# Patient Record
Sex: FEMALE | Race: WHITE | NOT HISPANIC OR LATINO | Employment: UNEMPLOYED | URBAN - METROPOLITAN AREA
[De-identification: names, ages, dates, MRNs, and addresses within clinical notes are randomized per-mention and may not be internally consistent; named-entity substitution may affect disease eponyms.]

---

## 2017-06-14 ENCOUNTER — ALLSCRIPTS OFFICE VISIT (OUTPATIENT)
Dept: OTHER | Facility: OTHER | Age: 42
End: 2017-06-14

## 2017-06-14 ENCOUNTER — HOSPITAL ENCOUNTER (OUTPATIENT)
Dept: RADIOLOGY | Facility: HOSPITAL | Age: 42
Discharge: HOME | End: 2017-06-14
Attending: ORTHOPAEDIC SURGERY
Payer: COMMERCIAL

## 2017-06-14 DIAGNOSIS — M25.561 PAIN IN RIGHT KNEE: ICD-10-CM

## 2017-06-14 PROCEDURE — 73562 X-RAY EXAM OF KNEE 3: CPT

## 2018-01-14 VITALS
HEART RATE: 101 BPM | BODY MASS INDEX: 21.88 KG/M2 | WEIGHT: 136.13 LBS | DIASTOLIC BLOOD PRESSURE: 80 MMHG | HEIGHT: 66 IN | SYSTOLIC BLOOD PRESSURE: 117 MMHG

## 2020-07-28 ENCOUNTER — HOSPITAL ENCOUNTER (OUTPATIENT)
Dept: RADIOLOGY | Facility: HOSPITAL | Age: 45
Discharge: HOME | End: 2020-07-28
Attending: ORTHOPAEDIC SURGERY
Payer: COMMERCIAL

## 2020-07-28 ENCOUNTER — OFFICE VISIT (OUTPATIENT)
Dept: OBGYN CLINIC | Facility: HOSPITAL | Age: 45
End: 2020-07-28
Payer: COMMERCIAL

## 2020-07-28 VITALS
HEIGHT: 66 IN | DIASTOLIC BLOOD PRESSURE: 83 MMHG | HEART RATE: 99 BPM | SYSTOLIC BLOOD PRESSURE: 134 MMHG | BODY MASS INDEX: 21.97 KG/M2

## 2020-07-28 DIAGNOSIS — M17.12 PRIMARY OSTEOARTHRITIS OF LEFT KNEE: ICD-10-CM

## 2020-07-28 DIAGNOSIS — M17.11 PRIMARY OSTEOARTHRITIS OF RIGHT KNEE: ICD-10-CM

## 2020-07-28 DIAGNOSIS — M25.561 PAIN IN BOTH KNEES, UNSPECIFIED CHRONICITY: Primary | ICD-10-CM

## 2020-07-28 DIAGNOSIS — M25.561 PAIN IN BOTH KNEES, UNSPECIFIED CHRONICITY: ICD-10-CM

## 2020-07-28 DIAGNOSIS — M25.562 PAIN IN BOTH KNEES, UNSPECIFIED CHRONICITY: Primary | ICD-10-CM

## 2020-07-28 DIAGNOSIS — M25.562 PAIN IN BOTH KNEES, UNSPECIFIED CHRONICITY: ICD-10-CM

## 2020-07-28 DIAGNOSIS — M25.572 ARTHRALGIA OF LEFT ANKLE: ICD-10-CM

## 2020-07-28 PROCEDURE — 73562 X-RAY EXAM OF KNEE 3: CPT

## 2020-07-28 PROCEDURE — 99203 OFFICE O/P NEW LOW 30 MIN: CPT | Performed by: ORTHOPAEDIC SURGERY

## 2020-07-28 PROCEDURE — 20610 DRAIN/INJ JOINT/BURSA W/O US: CPT | Performed by: ORTHOPAEDIC SURGERY

## 2020-07-28 PROCEDURE — 20605 DRAIN/INJ JOINT/BURSA W/O US: CPT | Performed by: ORTHOPAEDIC SURGERY

## 2020-07-28 RX ORDER — LIDOCAINE HYDROCHLORIDE 10 MG/ML
2 INJECTION, SOLUTION INFILTRATION; PERINEURAL
Status: COMPLETED | OUTPATIENT
Start: 2020-07-28 | End: 2020-07-28

## 2020-07-28 RX ORDER — LIDOCAINE HYDROCHLORIDE 10 MG/ML
1 INJECTION, SOLUTION INFILTRATION; PERINEURAL
Status: COMPLETED | OUTPATIENT
Start: 2020-07-28 | End: 2020-07-28

## 2020-07-28 RX ORDER — BUPIVACAINE HYDROCHLORIDE 2.5 MG/ML
2 INJECTION, SOLUTION INFILTRATION; PERINEURAL
Status: COMPLETED | OUTPATIENT
Start: 2020-07-28 | End: 2020-07-28

## 2020-07-28 RX ORDER — BETAMETHASONE SODIUM PHOSPHATE AND BETAMETHASONE ACETATE 3; 3 MG/ML; MG/ML
6 INJECTION, SUSPENSION INTRA-ARTICULAR; INTRALESIONAL; INTRAMUSCULAR; SOFT TISSUE
Status: COMPLETED | OUTPATIENT
Start: 2020-07-28 | End: 2020-07-28

## 2020-07-28 RX ORDER — BETAMETHASONE SODIUM PHOSPHATE AND BETAMETHASONE ACETATE 3; 3 MG/ML; MG/ML
12 INJECTION, SUSPENSION INTRA-ARTICULAR; INTRALESIONAL; INTRAMUSCULAR; SOFT TISSUE
Status: COMPLETED | OUTPATIENT
Start: 2020-07-28 | End: 2020-07-28

## 2020-07-28 RX ORDER — BUPIVACAINE HYDROCHLORIDE 2.5 MG/ML
1 INJECTION, SOLUTION INFILTRATION; PERINEURAL
Status: COMPLETED | OUTPATIENT
Start: 2020-07-28 | End: 2020-07-28

## 2020-07-28 RX ORDER — MELATONIN 5 MG
1 TABLET,CHEWABLE ORAL DAILY
COMMUNITY
Start: 2017-05-01

## 2020-07-28 RX ADMIN — BETAMETHASONE SODIUM PHOSPHATE AND BETAMETHASONE ACETATE 12 MG: 3; 3 INJECTION, SUSPENSION INTRA-ARTICULAR; INTRALESIONAL; INTRAMUSCULAR; SOFT TISSUE at 15:30

## 2020-07-28 RX ADMIN — LIDOCAINE HYDROCHLORIDE 1 ML: 10 INJECTION, SOLUTION INFILTRATION; PERINEURAL at 15:34

## 2020-07-28 RX ADMIN — BETAMETHASONE SODIUM PHOSPHATE AND BETAMETHASONE ACETATE 6 MG: 3; 3 INJECTION, SUSPENSION INTRA-ARTICULAR; INTRALESIONAL; INTRAMUSCULAR; SOFT TISSUE at 15:34

## 2020-07-28 RX ADMIN — BUPIVACAINE HYDROCHLORIDE 2 ML: 2.5 INJECTION, SOLUTION INFILTRATION; PERINEURAL at 15:30

## 2020-07-28 RX ADMIN — BUPIVACAINE HYDROCHLORIDE 1 ML: 2.5 INJECTION, SOLUTION INFILTRATION; PERINEURAL at 15:34

## 2020-07-28 RX ADMIN — LIDOCAINE HYDROCHLORIDE 2 ML: 10 INJECTION, SOLUTION INFILTRATION; PERINEURAL at 15:30

## 2020-07-28 NOTE — PROGRESS NOTES
Assessment:  1  Pain in both knees, unspecified chronicity  XR knee 3 vw right non injury    XR knee 3 vw left non injury   2  Primary osteoarthritis of right knee  Injection procedure prior authorization   3  Primary osteoarthritis of left knee  Injection procedure prior authorization   4  Arthralgia of left ankle         Plan:  The patient was provided with bilateral knee and left IA ankle steroid injections  The patient tolerated the procedure well  The patient should follow up in about 6 weeks for visco x1  To do next visit:  Return in about 3 months (around 10/28/2020) for visco x1   The above stated was discussed in layman's terms and the patient expressed understanding  All questions were answered to the patient's satisfaction  Scribe Attestation    I,:   Harrison Noguera am acting as a scribe while in the presence of the attending physician :        I,:   Catia Shelton MD personally performed the services described in this documentation    as scribed in my presence :              Subjective:   Last Mariee is a 53 y o  female who presents for initial evaluation of bilateral knees  She had longstanding history of knee issues  Today she complains generalized bilateral knee pain and left ankle pain  Excess activity aggravates while rest alleviates  She will use Tylenol for pain  She does use flexeril on occasion  She did find benefit from past physical therapy  She has had knee injections with benefit  She has tried visco-supplement with no benefit          Review of systems negative unless otherwise specified in HPI    Past Medical History:   Diagnosis Date    Environmental and seasonal allergies     Fibromyalgia     Lumbar dislocation     chronic pain    Osteoarthritis     Painful orthopaedic hardware ([address] )     PONV (postoperative nausea and vomiting)     severe patient states the patch helps       Past Surgical History:   Procedure Laterality Date    KNEE SURGERY Bilateral     6 left knee sx, 4 right knee sx    [place] REMOVAL DEEP IMPLANT Right 5/2/2016    Procedure: REMOVAL HARDWARE PATELLA;  Surgeon: Deon Lam MD;  [facility]: [facility];  Service: Orthopedics       History reviewed  No pertinent family history  Social History     Occupational History    Not on file   Tobacco Use    Smoking status: Never Smoker    Smokeless tobacco: Never Used   Substance and Sexual Activity    Alcohol use: Yes     Comment: social    Drug use: No    Sexual activity: Not on file         Current Outpatient Medications:     Melatonin 5 MG CHEW, Chew 1 tablet daily, Disp: , Rfl:     acetaminophen (TYLENOL) 500 mg tablet, Take 500 mg by mouth every 6 (six) hours as needed for mild pain , Disp: , Rfl:     cetirizine (ZyrTEC) 10 mg tablet, Take 10 mg by mouth daily as needed for allergies  , Disp: , Rfl:     cyclobenzaprine (FLEXERIL) 10 mg tablet, Take 10 mg by mouth daily as needed for muscle spasms  , Disp: , Rfl:     diphenhydrAMINE (BENADRYL) 25 mg tablet, Take 25 mg by mouth daily as needed for itching , Disp: , Rfl:     zolpidem (AMBIEN) 10 mg tablet, Take 10 mg by mouth daily at bedtime as needed for sleep , Disp: , Rfl:     Allergies   Allergen Reactions    Sertraline Hives     Annotation - [id number]:  Hives      Ultram [Tramadol] Hives    Xanax [Alprazolam]      insomnia    Zoloft [Sertraline Hcl] Hives    Duloxetine Rash    Nortriptyline Rash    Nsaids Rash    Tolmetin Rash            Vitals:    07/28/20 1449   BP: 134/83   Pulse: 99       Objective:  Physical exam  · General: Awake, Alert, Oriented  · Eyes: Pupils equal, round and reactive to light  · Heart: regular rate and rhythm  · Lungs: No audible wheezing  · Abdomen: soft                    Ortho Exam   Bilateral knees:  Well healed anterior incisions  Slight valgus alignment  TTP over medial joint line  No erythema or ecchymosis  No effusion or swelling  Normal strength  Good ROM       Left ankle:  Mild restriction of inversion, eversion, DF and PF  TTP over medial malleolus  TTP over anterior ankle  Calf compartments soft and supple  Sensation intact  Toes are warm sensate and mobile          Diagnostics, reviewed and taken today if performed as documented: The attending physician has personally reviewed the pertinent films in PACS and interpretation is as follows:  Bilateral knee x-rays:  S/p corrective osteotomy of proximal tibia        Procedures, if performed today:    Large joint arthrocentesis: R knee  Date/Time: 7/28/2020 3:30 PM  Consent given by: patient  Site marked: site marked  Supporting Documentation  Indications: pain   Procedure Details  Location: knee - R knee  Preparation: Patient was prepped and draped in the usual sterile fashion  Needle size: 22 G  Ultrasound guidance: no  Approach: anterolateral  Medications administered: 12 mg betamethasone acetate-betamethasone sodium phosphate 6 (3-3) mg/mL; 2 mL bupivacaine 0 25 %; 2 mL lidocaine 1 %    Patient tolerance: patient tolerated the procedure well with no immediate complications  Dressing:  Sterile dressing applied    Large joint arthrocentesis: L knee  Date/Time: 7/28/2020 3:30 PM  Consent given by: patient  Site marked: site marked  Supporting Documentation  Indications: pain   Procedure Details  Location: knee - L knee  Preparation: Patient was prepped and draped in the usual sterile fashion  Needle size: 22 G  Ultrasound guidance: no  Approach: anterolateral  Medications administered: 12 mg betamethasone acetate-betamethasone sodium phosphate 6 (3-3) mg/mL; 2 mL bupivacaine 0 25 %; 2 mL lidocaine 1 %    Patient tolerance: patient tolerated the procedure well with no immediate complications  Dressing:  Sterile dressing applied    Medium joint arthrocentesis: L ankle  Date/Time: 7/28/2020 3:34 PM  Consent given by: patient  Site marked: site marked  Timeout: Immediately prior to procedure a time out was called to verify the correct patient, procedure, equipment, support staff and site/side marked as required   Supporting Documentation  Indications: pain   Procedure Details  Location: ankle - L ankle  Needle size: 22 G  Ultrasound guidance: no  Approach: anterolateral  Medications administered: 1 mL bupivacaine 0 25 %; 6 mg betamethasone acetate-betamethasone sodium phosphate 6 (3-3) mg/mL; 1 mL lidocaine 1 %    Patient tolerance: patient tolerated the procedure well with no immediate complications  Dressing:  Sterile dressing applied          Portions of the record may have been created with voice recognition software  Occasional wrong word or "sound a like" substitutions may have occurred due to the inherent limitations of voice recognition software  Read the chart carefully and recognize, using context, where substitutions have occurred

## 2020-12-30 ENCOUNTER — OFFICE VISIT (OUTPATIENT)
Dept: OBGYN CLINIC | Facility: HOSPITAL | Age: 45
End: 2020-12-30
Payer: COMMERCIAL

## 2020-12-30 VITALS
SYSTOLIC BLOOD PRESSURE: 125 MMHG | HEIGHT: 66 IN | BODY MASS INDEX: 21.95 KG/M2 | DIASTOLIC BLOOD PRESSURE: 76 MMHG | HEART RATE: 94 BPM | WEIGHT: 136.6 LBS

## 2020-12-30 DIAGNOSIS — M17.12 ARTHRITIS OF LEFT KNEE: ICD-10-CM

## 2020-12-30 DIAGNOSIS — M25.562 CHRONIC PAIN OF LEFT KNEE: Primary | ICD-10-CM

## 2020-12-30 DIAGNOSIS — G89.29 CHRONIC PAIN OF LEFT KNEE: Primary | ICD-10-CM

## 2020-12-30 PROCEDURE — 20610 DRAIN/INJ JOINT/BURSA W/O US: CPT | Performed by: PHYSICIAN ASSISTANT

## 2021-04-14 ENCOUNTER — OFFICE VISIT (OUTPATIENT)
Dept: OBGYN CLINIC | Facility: MEDICAL CENTER | Age: 46
End: 2021-04-14
Payer: COMMERCIAL

## 2021-04-14 ENCOUNTER — APPOINTMENT (OUTPATIENT)
Dept: RADIOLOGY | Facility: MEDICAL CENTER | Age: 46
End: 2021-04-14
Payer: COMMERCIAL

## 2021-04-14 VITALS
HEIGHT: 66 IN | WEIGHT: 136 LBS | DIASTOLIC BLOOD PRESSURE: 78 MMHG | BODY MASS INDEX: 21.86 KG/M2 | HEART RATE: 84 BPM | SYSTOLIC BLOOD PRESSURE: 119 MMHG

## 2021-04-14 DIAGNOSIS — M54.50 ACUTE LOW BACK PAIN WITHOUT SCIATICA, UNSPECIFIED BACK PAIN LATERALITY: ICD-10-CM

## 2021-04-14 DIAGNOSIS — M54.41 CHRONIC BILATERAL LOW BACK PAIN WITH BILATERAL SCIATICA: Primary | ICD-10-CM

## 2021-04-14 DIAGNOSIS — G89.29 CHRONIC BILATERAL LOW BACK PAIN WITH BILATERAL SCIATICA: Primary | ICD-10-CM

## 2021-04-14 DIAGNOSIS — M54.42 CHRONIC BILATERAL LOW BACK PAIN WITH BILATERAL SCIATICA: Primary | ICD-10-CM

## 2021-04-14 PROCEDURE — 99214 OFFICE O/P EST MOD 30 MIN: CPT | Performed by: PHYSICAL MEDICINE & REHABILITATION

## 2021-04-14 PROCEDURE — 72100 X-RAY EXAM L-S SPINE 2/3 VWS: CPT

## 2021-04-14 RX ORDER — CYCLOBENZAPRINE HCL 10 MG
10 TABLET ORAL
Qty: 30 TABLET | Refills: 0 | Status: SHIPPED | OUTPATIENT
Start: 2021-04-14 | End: 2021-07-28 | Stop reason: SDUPTHER

## 2021-04-14 RX ORDER — METHYLPREDNISOLONE 4 MG/1
TABLET ORAL
Qty: 1 EACH | Refills: 0 | Status: SHIPPED | OUTPATIENT
Start: 2021-04-14

## 2021-04-14 NOTE — PATIENT INSTRUCTIONS
You will be starting physical therapy  It is important to do home exercises as given by your physical therapist as you go through PT to speed up your recovery  Physical therapy addresses the problems that are causing your pain, instead of covering them up, as some other treatment options do  We will see you back after completing physical therapy  Warm soaks with epsom salt can also help with muscle tightness/cramping  Epsom salt releases magnesium which can be helpful  Over-the-counter salonpas patches can be applied to the area of discomfort to help with pain  There are two types of patches; one contains lidocaine 4% and the other contains menthol, camphor and methyl salicylate  You can try one or the other and see which one works best for you

## 2021-04-14 NOTE — PROGRESS NOTES
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 myofascial spasticity/strain and postural/core instability  There are no concerning neurological deficits  Patient has findings consistent with spina bifida occulta on her lumbar spine x-rays  We discussed that this is something that has been with her since birth  We discussed different treatment options and decided to proceed with cyclobenzaprine and medrol dose pack  Patient reports rash to most NSAID's  The patient should start physical therapy as soon as possible  I will see them back after 6 weeks of physical therapy or earlier if symptoms worsen/change  Return in about 6 weeks (around 5/26/2021)  or earlier if symptoms worsen/change  Imaging Studies (I personally reviewed images in PACS and report if it was available):  Lumbar spine x-rays that are most recent to this encounter were reviewed  These images show findings consistent with spina bifida occulta  There is lumbarization of the 1st sacral segment  There is disc space narrowing in the last two disc spaces  There are no acute osseous abnormalities  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  HPI:  Neyda Jhaveri is a 50 y o  female  who presents for evaluation of   Chief Complaint   Patient presents with    Spine - Pain    Neck - Pain       Onset/Mechanism: Chronic pain for over 32 years without any injury  Location:  Across the low back and into upper and mid back on the right side    Radiation:  She gets it shooting down the back of both legs/thighs  She has tingling in her feet  Quality: Shooting  Provocative: Just sitting and turning can make the pain worse  Severity:  8/10 at its worst and currently a 5/10  Associated Symptoms: Trouble doing simple tasks  Treatment so far:  Patient has had extensive treatment for this including physical therapy, epidural steroid injections, facet joint blocks, acupuncture, chiropractic medication, narcotic medications and anti-inflammatory medications  She has had this treatment over the past 30 years or so  Recently saw [facility] pain management which showed stenosis  Review of Systems   Constitutional: Positive for activity change  Negative for fever  HENT: Negative for trouble swallowing  Eyes: Negative for visual disturbance  Respiratory: Negative for shortness of breath  Cardiovascular: Negative for chest pain  Gastrointestinal: Negative for abdominal pain  Denies bowel incontinence  Endocrine: Negative for polydipsia  Genitourinary:        Denies urinary incontinence  Musculoskeletal: Positive for back pain  Negative for gait problem  Skin: Negative for rash  Allergic/Immunologic: Negative for immunocompromised state  Neurological: Positive for numbness  Negative for weakness  Denies saddle anesthesia  Hematological: Does not bruise/bleed easily  Psychiatric/Behavioral: Negative for confusion  No red flag symptoms including fever/chills, unintentional weight change, bowel/bladder incontinence, scissoring gait, personal/family history of cancer, pain worse at night, intravenous drug abuse or trauma        Following history reviewed and updated:  Past Medical History:   Diagnosis Date    Environmental and seasonal allergies     Fibromyalgia     Lumbar dislocation     chronic pain    Osteoarthritis     Painful orthopaedic hardware ([address] )     PONV (postoperative nausea and vomiting)     severe patient states the patch helps     Past Surgical History:   Procedure Laterality Date    KNEE SURGERY Bilateral     6 left knee sx, 4 right knee sx    [place] REMOVAL DEEP IMPLANT Right 5/2/2016    Procedure: REMOVAL HARDWARE PATELLA;  Surgeon: Juan Whitten MD;  [facility];  Service: Orthopedics     Social History   Social History     Substance and Sexual Activity   Alcohol Use Yes    Comment: social     Social History     Substance and Sexual Activity   Drug Use No     Social History     Tobacco Use   Smoking Status Never Smoker   Smokeless Tobacco Never Used     History reviewed  No pertinent family history  Allergies   Allergen Reactions    Sertraline Hives     Annotation - [id number]: Hives      Ultram [Tramadol] Hives    Xanax [Alprazolam]      insomnia    Zoloft [Sertraline Hcl] Hives    Duloxetine Rash    Nortriptyline Rash    Nsaids Rash    Tolmetin Rash        Constitutional:  /78 (BP Location: Right arm, Patient Position: Sitting, Cuff Size: Standard)   Pulse 84   Ht 5' 6" (1 676 m)   Wt 61 7 kg (136 lb)   BMI 21 95 kg/m²    General: NAD  Eyes: Anicteric sclerae  Neck: Supple  Lungs: Unlabored breathing  Cardiovascular: No lower extremity edema  Skin: Intact without erythema  Neurologic: Sensation intact to light touch  Psychiatric: Mood and affect are appropriate  Orthopedic Examination   Inspection: No obvious deformities, lesions or rashes   ROM: Within normal limits   Palpation: Diffusely TTP both axially and peripherally throughout the thoracolumbar spine  There are no step offs   Neuro: Bilateral extremity strength is normal and symmetric  No muscle atrophy or abnormal tone  Bilateral extremity muscle stretch reflexes are physiologic and symmetric   No myelopathic signs  Sensation to light touch is intact throughout  Neural compression testing: Normal bilateral SLR/dural stretch  Positive BL Mariscal's maneuver    Positive left Nikhil's test   Gait is normal     Procedures

## 2021-04-20 NOTE — PROGRESS NOTES
PT Evaluation     Today's date: 2021  Patient name: Aiyana Gong  : 1975  MRN: [id number]  Referring provider: Devin Plascencia DO  Dx:   Encounter Diagnosis     ICD-10-CM    1  Chronic bilateral low back pain with bilateral sciatica  M54 42     M54 41     G89 29                   Assessment  Assessment details: PT notes the patient with decrease ROM and strength t/o the lumbar spine with trunk/core weakness leading to impaired spinal stability with increase pain levels, LE radicular symptoms, and functional limitations with need for course of skilled therapy for 6 weeks with focus on lumbar stabilization, manual therapy, posture, analgesic modalities, and HEP  Impairments: abnormal or restricted ROM, activity intolerance, impaired physical strength, lacks appropriate home exercise program and pain with function  Understanding of Dx/Px/POC: good   Prognosis: fair    Goals  ST  Initiate HEP  2  Decrease pain levels by 25-50%   3  Increase strength by 1-2 mm grades  4  Increase ROM by 25-50%   LT  Improve spinal stability with increase trunk/core strength   2  Decrease limitations with standing, walking and stairs  3  Decrease limitations with trunk movement and push/pull activities  4  Decrease limitations with lifting and carrying  5  Decrease limitations with ADL  6   DC with HEP     Plan  Plan details: PT notes review of POC and findings with patient who is in agreement with PT recommendations of course of skilled therapy     Patient would benefit from: skilled physical therapy and PT eval  Planned modality interventions: thermotherapy: hydrocollator packs  Planned therapy interventions: manual therapy, neuromuscular re-education, patient education, self care, strengthening, stretching, therapeutic exercise, home exercise program, graded exercise and flexibility  Frequency: 2x week  Duration in weeks: 6  Treatment plan discussed with: patient        Subjective Evaluation    History of Present Illness  Mechanism of injury: Patient reports dealing with increase LBP since childhood from degenerative changes t/o the lumbar spine  Patient was diagnosed with sacralization of the lower lumbar spine as well as stenosis of the lumbar spine  Patient reports was treated with multiple injections in the lumbar spine with minimal to no change on status  Patient reports several treatments by chiropractor with minimal change  Patient reports difficulty with sitting, driving, walking, standing, trunk movement, lifting, twisting, , ADL, and sleep  Patient reports constant N/T in the bilateral feet with R>L  Patient went ortho MD for evaluation and was referred to OPPT for evaluation and treatment of LBP  Patient reports she is employed as [employer] with significant PMH of OA and 11 bilateral knee surgeries for patellar realignment  Pain  Current pain ratin  At best pain ratin  At worst pain ratin  Location: Lumbar spine   Quality: radiating, tight, discomfort and dull ache  Relieving factors: heat and rest  Aggravating factors: stair climbing, walking, standing and lifting      Diagnostic Tests  X-ray: abnormal  MRI studies: abnormal  Treatments  Previous treatment: chiropractic, medication, physical therapy and injection treatment  Current treatment: physical therapy  Patient Goals  Patient goals for therapy: decreased pain, increased motion, increased strength and independence with ADLs/IADLs          Objective     Palpation   Left   Muscle spasm in the erector spinae and lumbar paraspinals  Tenderness of the erector spinae and lumbar paraspinals  Right   Muscle spasm in the erector spinae and lumbar paraspinals  Tenderness of the erector spinae and lumbar paraspinals  Tenderness     Lumbar Spine  Tenderness in the spinous process  Left Hip   Tenderness in the PSIS  Right Hip   Tenderness in the PSIS       Neurological Testing Reflexes   Left   Patellar (L4): normal (2+)  Achilles (S1): normal (2+)    Right   Patellar (L4): normal (2+)  Achilles (S1): normal (2+)    Active Range of Motion     Lumbar   Flexion:  WFL and with pain  Extension: 19 degrees  with pain  Left lateral flexion: 24 degrees    with pain  Right lateral flexion: 23 degrees   Left rotation: 17 degrees   Right rotation: 15 degrees   Left Hip   Flexion: 81 degrees   Extension: 2 degrees   Abduction: WFL  External rotation (90/90): 21 degrees   Internal rotation (90/90): 11 degrees     Right Hip   Flexion: 83 degrees   Extension: 3 degrees   Abduction: WFL  External rotation (90/90): 19 degrees   Internal rotation (90/90): 11 degrees   Left Knee   Normal active range of motion    Right Knee   Normal active range of motion    Additional Active Range of Motion Details  PT notes decrease ROM and strength t/o the lumbar spine with abdominals of 3/5 and lumbar paraspinals of 2+/5     Passive Range of Motion   Left Hip   Flexion: WFL  Extension: 6 degrees   External rotation (90/90): 21 degrees   Internal rotation (90/90): 15 degrees     Right Hip   Flexion: WFL  Extension: 5 degrees   External rotation (90/90): 20 degrees   Internal rotation (90/90): 14 degrees   Mechanical Assessment    Cervical      Thoracic      Lumbar    Standing flexion: repeated movements   Pain location:peripheralized  Pain intensity: worse  Pain level: increased  Standing extension: repeated movements  Pain location: centralized  Pain intensity: better  Pain level: decreased  Left Sidegliding: repeated movements  Pain location: centralized  Pain intensity: worse  Pain level: increased  Right sidegliding: repeated movements  Pain location: centralized  Pain intensity:worse  Pain level: increased    Strength/Myotome Testing     Left Hip   Planes of Motion   Flexion: 4  Extension: 4+  Abduction: 4+  Adduction: 5  External rotation: 4-  Internal rotation: 4-    Right Hip   Planes of Motion   Flexion: 4  Extension: 4+  Abduction: 4+  Adduction: 5  External rotation: 4-  Internal rotation: 4-    Left Knee   Flexion: 4  Extension: 4-  Quadriceps contraction: fair    Right Knee   Flexion: 4  Extension: 4-  Quadriceps contraction: fair    Tests     Left Hip   Jamar: Positive  Right Hip   Jamar: Positive                Precautions:  Chronic bilateral knee pain       Manuals 4/21        Bilateral HS, piriformis, quad, ITB and gastroc NV         Manual lumbar traction with PA mobs NV                           Neuro Re-Ed         Stand Lumbar extension against bar         Stand OAL          PPU with exhale          Bridge with Tball          Supine Tball ABD crunch          Seated Tball PNF and trunk rotation         Seated hip march and LAQ                                                       Ther Ex         Treadmill         Tball LTR                                                       HEP update/review  15 min         Ther Activity                           Gait Training                           Modalities         MHP to the LB in seated 15 min

## 2021-04-21 ENCOUNTER — EVALUATION (OUTPATIENT)
Dept: PHYSICAL THERAPY | Facility: CLINIC | Age: 46
End: 2021-04-21
Payer: COMMERCIAL

## 2021-04-21 DIAGNOSIS — G89.29 CHRONIC BILATERAL LOW BACK PAIN WITH BILATERAL SCIATICA: Primary | ICD-10-CM

## 2021-04-21 DIAGNOSIS — M54.42 CHRONIC BILATERAL LOW BACK PAIN WITH BILATERAL SCIATICA: Primary | ICD-10-CM

## 2021-04-21 DIAGNOSIS — M54.41 CHRONIC BILATERAL LOW BACK PAIN WITH BILATERAL SCIATICA: Primary | ICD-10-CM

## 2021-04-21 PROCEDURE — 97162 PT EVAL MOD COMPLEX 30 MIN: CPT | Performed by: PHYSICAL THERAPIST

## 2021-04-21 PROCEDURE — 97535 SELF CARE MNGMENT TRAINING: CPT | Performed by: PHYSICAL THERAPIST

## 2021-04-28 ENCOUNTER — OFFICE VISIT (OUTPATIENT)
Dept: PHYSICAL THERAPY | Facility: CLINIC | Age: 46
End: 2021-04-28
Payer: COMMERCIAL

## 2021-04-28 DIAGNOSIS — M54.41 CHRONIC BILATERAL LOW BACK PAIN WITH BILATERAL SCIATICA: Primary | ICD-10-CM

## 2021-04-28 DIAGNOSIS — M54.42 CHRONIC BILATERAL LOW BACK PAIN WITH BILATERAL SCIATICA: Primary | ICD-10-CM

## 2021-04-28 DIAGNOSIS — G89.29 CHRONIC BILATERAL LOW BACK PAIN WITH BILATERAL SCIATICA: Primary | ICD-10-CM

## 2021-04-28 PROCEDURE — 97140 MANUAL THERAPY 1/> REGIONS: CPT

## 2021-04-28 PROCEDURE — 97110 THERAPEUTIC EXERCISES: CPT

## 2021-04-28 NOTE — PROGRESS NOTES
Daily Note     Today's date: 2021  Patient name: Verona Orourke  : 1975  MRN: [id number]  Referring provider: Genia Hills DO  Dx:   Encounter Diagnosis     ICD-10-CM    1  Chronic bilateral low back pain with bilateral sciatica  M54 42     M54 41     G89 29                   Subjective: patient stated she has low back pain that wraps around her pelvis                       Pain in 6/10      Objective: See treatment diary below      Assessment: Educated patient on POC established for their specific diagnosis and their LOF  Verbal and visual cues required for proper execution of exercise with program  Overall tolerated treatment well  Will continue to progress in upcoming visits as able      Plan: Continue per plan of care  Progress treatment as tolerated         Precautions:  Chronic bilateral knee pain       Manuals        Bilateral HS, piriformis, quad, ITB and gastroc NV  10 min        Manual lumbar traction with PA mobs NV  5 min                         Neuro Re-Ed         Stand Lumbar extension against bar  10x 5" hold        Stand OAL   10x 5" hold       PPU with exhale   10x       Bridge with Tball          Supine Tball ABD crunch   10x each       Seated Tball PNF and trunk rotation  10x each       Seated hip march and LAQ on physio ball   10x each                                                     Ther Ex         Treadmill  10 min   1 5 mph        Tball LTR   10x                                                     HEP update/review  15 min         Ther Activity                           Gait Training                           Modalities         MHP to the LB in seated 15 min  15 min

## 2021-05-03 ENCOUNTER — OFFICE VISIT (OUTPATIENT)
Dept: PHYSICAL THERAPY | Facility: CLINIC | Age: 46
End: 2021-05-03
Payer: COMMERCIAL

## 2021-05-03 DIAGNOSIS — M54.42 CHRONIC BILATERAL LOW BACK PAIN WITH BILATERAL SCIATICA: Primary | ICD-10-CM

## 2021-05-03 DIAGNOSIS — M54.41 CHRONIC BILATERAL LOW BACK PAIN WITH BILATERAL SCIATICA: Primary | ICD-10-CM

## 2021-05-03 DIAGNOSIS — G89.29 CHRONIC BILATERAL LOW BACK PAIN WITH BILATERAL SCIATICA: Primary | ICD-10-CM

## 2021-05-03 PROCEDURE — 97140 MANUAL THERAPY 1/> REGIONS: CPT

## 2021-05-03 PROCEDURE — 97112 NEUROMUSCULAR REEDUCATION: CPT

## 2021-05-03 PROCEDURE — 97110 THERAPEUTIC EXERCISES: CPT

## 2021-05-03 NOTE — PROGRESS NOTES
Daily Note     Today's date: 5/3/2021  Patient name: Andrey Malik  : 1975  MRN: [id number]  Referring provider: Alfonso Choi DO  Dx:   Encounter Diagnosis     ICD-10-CM    1  Chronic bilateral low back pain with bilateral sciatica  M54 42     M54 41     G89 29                   Subjective: patient stated she was sore after last therapy session  Objective: See treatment diary below      Assessment: Patient tolerated treatment well  No exacerbation of sx with program  Patient able to complete all tasks assigned with good execution  Patient would benefit from continued therapy to decrease pain and increase strength and flexibility to improve LOF  Plan: Continue per plan of care  Progress treatment as tolerated         Precautions:  Chronic bilateral knee pain       Manuals 4/21 4/28 5/3      Bilateral HS, piriformis, quad, ITB and gastroc NV  10 min  10 min       Manual lumbar traction with PA mobs NV  5 min 5 min                         Neuro Re-Ed         Stand Lumbar extension against bar  10x 5" hold  10x 5" hold       Stand OAL   10x 5" hold 10x 5" hold       PPU with exhale   10x 10x       Bridge with Tball    2x10      Supine Tball ABD crunch   10x each 10x each      Seated Tball PNF and trunk rotation  10x each 15x each       Seated hip march and LAQ on physio ball   10x each  2x10 each                                                    Ther Ex         Treadmill  10 min   1 5 mph  10 min  2 0 mph       Tball LTR   10x  10x                                                    HEP update/review  15 min         Ther Activity                           Gait Training                           Modalities         MHP to the LB in seated 15 min  15 min 15 min

## 2021-05-05 ENCOUNTER — APPOINTMENT (OUTPATIENT)
Dept: PHYSICAL THERAPY | Facility: CLINIC | Age: 46
End: 2021-05-05
Payer: COMMERCIAL

## 2021-05-13 ENCOUNTER — OFFICE VISIT (OUTPATIENT)
Dept: PHYSICAL THERAPY | Facility: CLINIC | Age: 46
End: 2021-05-13
Payer: COMMERCIAL

## 2021-05-13 DIAGNOSIS — G89.29 CHRONIC BILATERAL LOW BACK PAIN WITH BILATERAL SCIATICA: Primary | ICD-10-CM

## 2021-05-13 DIAGNOSIS — M54.41 CHRONIC BILATERAL LOW BACK PAIN WITH BILATERAL SCIATICA: Primary | ICD-10-CM

## 2021-05-13 DIAGNOSIS — M54.42 CHRONIC BILATERAL LOW BACK PAIN WITH BILATERAL SCIATICA: Primary | ICD-10-CM

## 2021-05-13 PROCEDURE — 97110 THERAPEUTIC EXERCISES: CPT

## 2021-05-13 PROCEDURE — 97112 NEUROMUSCULAR REEDUCATION: CPT

## 2021-05-13 PROCEDURE — 97140 MANUAL THERAPY 1/> REGIONS: CPT

## 2021-05-13 NOTE — PROGRESS NOTES
Daily Note     Today's date: 2021  Patient name: Dilshad Aquino  : 1975  MRN: [id number]  Referring provider: Isrrael Mckeon DO  Dx:   Encounter Diagnosis     ICD-10-CM    1  Chronic bilateral low back pain with bilateral sciatica  M54 42     M54 41     G89 29                   Subjective: patient reported no change in sx still having pain in low back with numbness in right LE  Patient did say she had numbness in her left great toe but did not last        Objective: See treatment diary below      Assessment: continue to work with same POC of extension based and core stabilization exercises to decrease sx  Added 2 new exercises with good tolerance and good execution  Patient would benefit from continued therapy to decrease pain and increase strength and flexibility to improve overall LOF      Plan: Continue per plan of care  Progress treatment as tolerated         Precautions:  Chronic bilateral knee pain       Manuals 4/21 4/28 5/3 5/13     Bilateral HS, piriformis, quad, ITB and gastroc NV  10 min  10 min  12 min     Manual lumbar traction with PA mobs NV  5 min 5 min  3 min traction only                       Neuro Re-Ed         Stand Lumbar extension against bar  10x 5" hold  10x 5" hold  10x 5" hold     Stand OAL   10x 5" hold 10x 5" hold  10x 5" hold      PPU with exhale   10x 10x  10x     Bridge with Tball    2x10 10x     Supine Tball ABD crunch   10x each 10x each 10x each     Seated Tball PNF and trunk rotation  10x each 15x each  15x each     Seated hip march and LAQ on physio ball   10x each  2x10 each  2x10 each     Stan flossing     3x 10 pumps  bilat                                          Ther Ex         Treadmill  10 min   1 5 mph  10 min  2 0 mph  10 min  2 0 mph     Tball LTR   10x  10x  10x 3" hold      Core stabalization: side step/arms out hold T-band    10x each way  Single blue band                                         HEP update/review  15 min         Ther Activity Gait Training                           Modalities         MHP to the LB in seated 15 min  15 min 15 min 15 min

## 2021-05-19 ENCOUNTER — OFFICE VISIT (OUTPATIENT)
Dept: PHYSICAL THERAPY | Facility: CLINIC | Age: 46
End: 2021-05-19
Payer: COMMERCIAL

## 2021-05-19 DIAGNOSIS — G89.29 CHRONIC BILATERAL LOW BACK PAIN WITH BILATERAL SCIATICA: Primary | ICD-10-CM

## 2021-05-19 DIAGNOSIS — M54.42 CHRONIC BILATERAL LOW BACK PAIN WITH BILATERAL SCIATICA: Primary | ICD-10-CM

## 2021-05-19 DIAGNOSIS — M54.41 CHRONIC BILATERAL LOW BACK PAIN WITH BILATERAL SCIATICA: Primary | ICD-10-CM

## 2021-05-19 PROCEDURE — 97140 MANUAL THERAPY 1/> REGIONS: CPT

## 2021-05-19 PROCEDURE — 97112 NEUROMUSCULAR REEDUCATION: CPT

## 2021-05-19 PROCEDURE — 97110 THERAPEUTIC EXERCISES: CPT

## 2021-05-19 NOTE — PROGRESS NOTES
Daily Note     Today's date: 2021  Patient name: Que Villarreal  : 1975  MRN: [id number]  Referring provider: Varsha Bautista DO  Dx:   Encounter Diagnosis     ICD-10-CM    1  Chronic bilateral low back pain with bilateral sciatica  M54 42     M54 41     G89 29                   Subjective: patient stated she will not be able to come for two weeks after today due to conflict in schedule  Patient stated her pain level is about the same 2/10      Objective: See treatment diary below      Assessment: Patient able to progress with repetitions and resistance with various exercises  Good tolerance overall with progression with minimal cues required  Some pain in bilateral knees with side step with resistance  Patient continues to present with deficits with strength and ROM requiring continued skilled physical therapy    Plan: Continue per plan of care  Progress treatment as tolerated         Precautions:  Chronic bilateral knee pain       Manuals 4/21 4/28 5/3 5/13 5/19    Bilateral HS, piriformis, quad, ITB and gastroc NV  10 min  10 min  12 min 12 min     Manual lumbar traction with PA mobs NV  5 min 5 min  3 min traction only 3 min   Traction only                      Neuro Re-Ed         Stand Lumbar extension against bar  10x 5" hold  10x 5" hold  10x 5" hold 10x     Stand OAL   10x 5" hold 10x 5" hold  10x 5" hold  10x 5" hold    PPU with exhale   10x 10x  10x 10x    Bridge with Tball    2x10 10x 10x    Supine Tball ABD crunch   10x each 10x each 10x each 10x each    Seated Tball PNF and trunk rotation  10x each 15x each  15x each 15x each    Seated hip march and LAQ on physio ball   10x each  2x10 each  2x10 each 2x10 each    Stan flossing     3x 10 pumps  bilat  3x 10 pumps bilat                                         Ther Ex         Treadmill  10 min   1 5 mph  10 min  2 0 mph  10 min  2 0 mph 10 min  2 0 mph    Tball LTR   10x  10x  10x 3" hold  10x 3" hold     Core stabalization: side step/arms out hold T-band    10x each way  Single blue band 10x each  Way single blue band                                         HEP update/review  15 min         Ther Activity                           Gait Training                           Modalities         MHP to the LB in seated 15 min  15 min 15 min 15 min  15 min

## 2021-06-01 ENCOUNTER — TELEPHONE (OUTPATIENT)
Dept: OBGYN CLINIC | Facility: HOSPITAL | Age: 46
End: 2021-06-01

## 2021-06-07 ENCOUNTER — OFFICE VISIT (OUTPATIENT)
Dept: PHYSICAL THERAPY | Facility: CLINIC | Age: 46
End: 2021-06-07
Payer: COMMERCIAL

## 2021-06-07 DIAGNOSIS — M54.42 CHRONIC BILATERAL LOW BACK PAIN WITH BILATERAL SCIATICA: Primary | ICD-10-CM

## 2021-06-07 DIAGNOSIS — M54.41 CHRONIC BILATERAL LOW BACK PAIN WITH BILATERAL SCIATICA: Primary | ICD-10-CM

## 2021-06-07 DIAGNOSIS — G89.29 CHRONIC BILATERAL LOW BACK PAIN WITH BILATERAL SCIATICA: Primary | ICD-10-CM

## 2021-06-07 PROCEDURE — 97140 MANUAL THERAPY 1/> REGIONS: CPT

## 2021-06-07 PROCEDURE — 97110 THERAPEUTIC EXERCISES: CPT

## 2021-06-07 PROCEDURE — 97112 NEUROMUSCULAR REEDUCATION: CPT

## 2021-06-07 NOTE — PROGRESS NOTES
Daily Note     Today's date: 2021  Patient name: Maddison Green  : 1975  MRN: [id number]  Referring provider: Jessica Guardado DO  Dx:   Encounter Diagnosis     ICD-10-CM    1  Chronic bilateral low back pain with bilateral sciatica  M54 42     M54 41     G89 29                   Subjective: patient c/o bilateral knee pain today  Patient stated tiny movement will suddenly hurt her back  Patient stated its hard to avoid becaous      Objective: See treatment diary below      Assessment: patient was able to complete all tasks despite subjective comments  PTA noted continuation of progrssion of POC to focus on core stabilization and LE strengthening to decrease pain levels improve functional limitations to meet goals  Plan: Continue per plan of care  Progress treatment as tolerated         Precautions:  Chronic bilateral knee pain       Manuals 4/21 4/28 5/3 5/13 5/19 6/7   Bilateral HS, piriformis, quad, ITB and gastroc NV  10 min  10 min  12 min 12 min  12min   Manual lumbar traction with PA mobs NV  5 min 5 min  3 min traction only 3 min   Traction only 3 min traction only                      Neuro Re-Ed         Stand Lumbar extension against bar  10x 5" hold  10x 5" hold  10x 5" hold 10x  10x   Stand OAL   10x 5" hold 10x 5" hold  10x 5" hold  10x 5" hold 10x 5" hold   PPU with exhale   10x 10x  10x 10x    Bridge with Tball    2x10 10x 10x 10x   Supine Tball ABD crunch   10x each 10x each 10x each 10x each 10x each   Seated Tball PNF and trunk rotation  10x each 15x each  15x each 15x each 2x10  1#   Seated hip march and LAQ on physio ball   10x each  2x10 each  2x10 each 2x10 each 2x10   Red wt ball    Stan flossing     3x 10 pumps  bilat  3x 10 pumps bilat  Missed                                        Ther Ex         Treadmill  10 min   1 5 mph  10 min  2 0 mph  10 min  2 0 mph 10 min  2 0 mph 10 min  1 5 mphy   Tball LTR   10x  10x  10x 3" hold  10x 3" hold  10x 3" hold    Core stabalization: side step/arms out hold T-band    10x each way  Single blue band 10x each  Way single blue band  10x each way single blue band                                        HEP update/review  15 min         Ther Activity                           Gait Training                           Modalities         MHP to the LB in seated 15 min  15 min 15 min 15 min  15 min 15 min

## 2021-06-09 ENCOUNTER — OFFICE VISIT (OUTPATIENT)
Dept: PHYSICAL THERAPY | Facility: CLINIC | Age: 46
End: 2021-06-09
Payer: COMMERCIAL

## 2021-06-09 DIAGNOSIS — M54.42 CHRONIC BILATERAL LOW BACK PAIN WITH BILATERAL SCIATICA: Primary | ICD-10-CM

## 2021-06-09 DIAGNOSIS — M54.41 CHRONIC BILATERAL LOW BACK PAIN WITH BILATERAL SCIATICA: Primary | ICD-10-CM

## 2021-06-09 DIAGNOSIS — G89.29 CHRONIC BILATERAL LOW BACK PAIN WITH BILATERAL SCIATICA: Primary | ICD-10-CM

## 2021-06-09 PROCEDURE — 97140 MANUAL THERAPY 1/> REGIONS: CPT

## 2021-06-09 PROCEDURE — 97110 THERAPEUTIC EXERCISES: CPT

## 2021-06-09 NOTE — PROGRESS NOTES
Daily Note     Today's date: 2021  Patient name: Adrianna Jamil  : 1975  MRN: [id number]  Referring provider: Kasandra Anderson DO  Dx:   Encounter Diagnosis     ICD-10-CM    1  Chronic bilateral low back pain with bilateral sciatica  M54 42     M54 41     G89 29                   Subjective: patient c/o left low back pain today reaching 6-7/10 on pain scale reaching mid hamstring  Objective: See treatment diary below      Assessment: patient tested positive for leg length discrepancy anteriorly rotated on left side needing manual correction  Patient experience relief immediately post correction  Will continue monitor hip placement and progress or modify as needed  Plan: Continue per plan of care  Progress treatment as tolerated         Precautions:  Chronic bilateral knee pain       Manuals 6/9  4/28 5/3 5/13 5/19 6/7   Bilateral HS, piriformis, quad, ITB and gastroc   10 min  10 min  12 min 12 min  12min   Manual lumbar traction with PA mobs  5 min 5 min  3 min traction only 3 min   Traction only 3 min traction only    Leg length discrepancy correction  15 min                  Neuro Re-Ed         Stand Lumbar extension against bar 10x 5' hold  10x 5" hold  10x 5" hold  10x 5" hold 10x  10x   Stand OAL  10x 5" hold  10x 5" hold 10x 5" hold  10x 5" hold  10x 5" hold 10x 5" hold   PPU with exhale  10x  10x 10x  10x 10x    Bridge with Tball    2x10 10x 10x 10x   Supine Tball ABD crunch   10x each 10x each 10x each 10x each 10x each   Seated Tball PNF and trunk rotation  10x each 15x each  15x each 15x each 2x10  1#   Seated hip march and LAQ on physio ball   10x each  2x10 each  2x10 each 2x10 each 2x10   Red wt ball    Stan flossing     3x 10 pumps  bilat  3x 10 pumps bilat  Missed                                        Ther Ex         Treadmill 10 min  2 0 mph  10 min   1 5 mph  10 min  2 0 mph  10 min  2 0 mph 10 min  2 0 mph 10 min  1 5 mphy   Tball LTR   10x  10x  10x 3" hold  10x 3" hold  10x 3" hold    Core stabalization: side step/arms out hold T-band    10x each way  Single blue band 10x each  Way single blue band  10x each way single blue band                                        HEP update/review          Ther Activity                           Gait Training                           Modalities         MHP to the LB in seated 15 min  15 min 15 min 15 min  15 min 15 min

## 2021-06-15 ENCOUNTER — OFFICE VISIT (OUTPATIENT)
Dept: PHYSICAL THERAPY | Facility: CLINIC | Age: 46
End: 2021-06-15
Payer: COMMERCIAL

## 2021-06-15 DIAGNOSIS — M54.41 CHRONIC BILATERAL LOW BACK PAIN WITH BILATERAL SCIATICA: Primary | ICD-10-CM

## 2021-06-15 DIAGNOSIS — M54.42 CHRONIC BILATERAL LOW BACK PAIN WITH BILATERAL SCIATICA: Primary | ICD-10-CM

## 2021-06-15 DIAGNOSIS — G89.29 CHRONIC BILATERAL LOW BACK PAIN WITH BILATERAL SCIATICA: Primary | ICD-10-CM

## 2021-06-15 PROCEDURE — 97140 MANUAL THERAPY 1/> REGIONS: CPT

## 2021-06-15 PROCEDURE — 97112 NEUROMUSCULAR REEDUCATION: CPT

## 2021-06-15 PROCEDURE — 97110 THERAPEUTIC EXERCISES: CPT

## 2021-06-15 NOTE — PROGRESS NOTES
Daily Note     Today's date: 6/15/2021  Patient name: Enid Vera  : 1975  MRN: [id number]  Referring provider: Tony Shahid DO  Dx:   Encounter Diagnosis     ICD-10-CM    1  Chronic bilateral low back pain with bilateral sciatica  M54 42     M54 41     G89 29                   Subjective: patient stated she was feeling good after last visit but felt a pop in low back when she went to sit down  Objective: See treatment diary below      Assessment: Patient progressing well towards goals  Good execution of exercise with program  Patient demonstrated the appropriate amount of fatigue for program   Will continue to monitor pain levels and progress as able  Plan: Continue per plan of care  Progress treatment as tolerated  Precautions:  Chronic bilateral knee pain       Manuals 6/9  6/15 5/3 5/13 5/19 6/7   Bilateral HS, piriformis, quad, ITB and gastroc     10 min  12 min 12 min  12min   Manual lumbar traction with PA mobs   5 min  3 min traction only 3 min   Traction only 3 min traction only    Leg length discrepancy correction  15 min  15 min  Anteriorly rotated left side                   Neuro Re-Ed         Stand Lumbar extension against bar 10x 5' hold  10x 5" hold  10x 5" hold  10x 5" hold 10x  10x   Stand OAL  10x 5" hold  10x 5" hold 10x 5" hold  10x 5" hold  10x 5" hold 10x 5" hold   PPU with exhale  10x  10x  10x 10x    Bridge with Tball   10x 2x10 10x 10x 10x   Supine Tball ABD crunch   10x each 10x each 10x each 10x each 10x each   Seated Tball PNF and trunk rotation  2x10  1# 15x each  15x each 15x each 2x10  1#   Seated hip march and LAQ on physio ball   2x10 red each  2x10 each  2x10 each 2x10 each 2x10   Red wt ball    Stan flossing   3x 10 pumps bilat   3x 10 pumps  bilat  3x 10 pumps bilat  Missed                                        Ther Ex         Treadmill 10 min  2 0 mph  10 min   1 5 mph  10 min  2 0 mph  10 min  2 0 mph 10 min  2 0 mph 10 min  1 5 mphy   Tball LTR   10x  10x  10x 3" hold  10x 3" hold  10x 3" hold    Core stabalization: side step/arms out hold T-band  10x each way single blue band  10x each way  Single blue band 10x each  Way single blue band  10x each way single blue band                                        HEP update/review          Ther Activity                           Gait Training                           Modalities         MHP to the LB in seated 15 min  15 min 15 min 15 min  15 min 15 min

## 2021-07-12 ENCOUNTER — EVALUATION (OUTPATIENT)
Dept: PHYSICAL THERAPY | Facility: CLINIC | Age: 46
End: 2021-07-12
Payer: COMMERCIAL

## 2021-07-12 DIAGNOSIS — M54.42 CHRONIC BILATERAL LOW BACK PAIN WITH BILATERAL SCIATICA: Primary | ICD-10-CM

## 2021-07-12 DIAGNOSIS — M54.41 CHRONIC BILATERAL LOW BACK PAIN WITH BILATERAL SCIATICA: Primary | ICD-10-CM

## 2021-07-12 DIAGNOSIS — G89.29 CHRONIC BILATERAL LOW BACK PAIN WITH BILATERAL SCIATICA: Primary | ICD-10-CM

## 2021-07-12 PROCEDURE — 97112 NEUROMUSCULAR REEDUCATION: CPT | Performed by: PHYSICAL THERAPIST

## 2021-07-12 PROCEDURE — 97140 MANUAL THERAPY 1/> REGIONS: CPT | Performed by: PHYSICAL THERAPIST

## 2021-07-12 NOTE — PROGRESS NOTES
PT Evaluation     Today's date: 2021  Patient name: Chip Cabezas  : 1975  MRN: [id number]  Referring provider: Taqueria Ojeda DO  Dx:   Encounter Diagnosis     ICD-10-CM    1  Chronic bilateral low back pain with bilateral sciatica  M54 42     M54 41     G89 29                   Assessment  Assessment details: PT notes the patient with decrease ROM and strength t/o the lumbar spine with trunk/core weakness leading to impaired spinal stability with increase pain levels, LE radicular symptoms, and functional limitations with need for course of skilled therapy for 6 weeks with focus on lumbar stabilization, manual therapy, posture, and HEP  Impairments: abnormal or restricted ROM, activity intolerance, impaired physical strength, lacks appropriate home exercise program and pain with function  Understanding of Dx/Px/POC: good   Prognosis: fair    Goals  ST  Initiate HEP - MET  2  Decrease pain levels by 25-50% - Progressing  3  Increase strength by 1-2 mm grades - Progressing  4  Increase ROM by 25-50% - Progressing  LT  Improve spinal stability with increase trunk/core strength - Progressing  2  Decrease limitations with standing, walking and stairs - Progressing  3  Decrease limitations with trunk movement and push/pull activities - Progressing  4  Decrease limitations with lifting and carrying - Progressing  5  Decrease limitations with ADL - Progressing  6  DC with HEP - Progressing    Plan  Plan details: PT notes review of POC and findings with patient who is in agreement with PT recommendations of course of skilled therapy     Patient would benefit from: skilled physical therapy and PT eval  Planned modality interventions: thermotherapy: hydrocollator packs  Planned therapy interventions: manual therapy, neuromuscular re-education, patient education, self care, strengthening, stretching, therapeutic exercise, home exercise program, graded exercise and flexibility  Frequency: 2x week  Duration in weeks: 6  Treatment plan discussed with: patient        Subjective Evaluation    History of Present Illness  Mechanism of injury: Patient reports dealing with increase LBP since childhood from degenerative changes t/o the lumbar spine  Patient was diagnosed with sacralization of the lower lumbar spine as well as stenosis of the lumbar spine  Patient reports was treated with multiple injections in the lumbar spine with minimal to no change on status  Patient reports several treatments by chiropractor with minimal change  Patient reports difficulty with sitting, driving, walking, standing, trunk movement, lifting, twisting, , ADL, and sleep  Patient reports constant N/T in the bilateral feet with R>L  Patient went ortho MD for evaluation and was referred to OPPT for evaluation and treatment of LBP  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 11 bilateral knee surgeries for patellar realignment  Pain  Current pain ratin  At best pain rating: 3  At worst pain ratin  Location: Lumbar spine   Quality: radiating, tight, discomfort and dull ache  Relieving factors: heat and rest  Aggravating factors: stair climbing, walking, standing and lifting      Diagnostic Tests  X-ray: abnormal  MRI studies: abnormal  Treatments  Previous treatment: chiropractic, medication, physical therapy and injection treatment  Current treatment: physical therapy  Patient Goals  Patient goals for therapy: decreased pain, increased motion, increased strength and independence with ADLs/IADLs          Objective     Palpation   Left   Muscle spasm in the erector spinae and lumbar paraspinals  Tenderness of the erector spinae and lumbar paraspinals  Right   Muscle spasm in the erector spinae and lumbar paraspinals  Tenderness of the erector spinae and lumbar paraspinals  Tenderness     Lumbar Spine  Tenderness in the spinous process       Left Hip Tenderness in the PSIS  Right Hip   Tenderness in the PSIS  Neurological Testing     Reflexes   Left   Patellar (L4): normal (2+)  Achilles (S1): normal (2+)    Right   Patellar (L4): normal (2+)  Achilles (S1): normal (2+)    Active Range of Motion     Lumbar   Flexion:  WFL and with pain  Extension: 19 degrees  with pain  Left lateral flexion: 24 degrees    with pain  Right lateral flexion: 23 degrees     Left Hip   Flexion: 104 degrees   Extension: 0 degrees   Abduction: WFL  External rotation (90/90): 21 degrees   Internal rotation (90/90): 11 degrees     Right Hip   Flexion: 110 degrees   Extension: -2 degrees   Abduction: WFL  External rotation (90/90): 19 degrees   Internal rotation (90/90): 11 degrees   Left Knee   Normal active range of motion    Right Knee   Normal active range of motion    Additional Active Range of Motion Details  PT notes decrease ROM and strength t/o the lumbar spine with abdominals of 3/5 and lumbar paraspinals of 2+/5         Lumbar    Standing flexion: repeated movements   Pain location:peripheralized  Pain intensity: worse  Pain level: increased  Standing extension: repeated movements  Pain location: centralized  Pain intensity: better  Pain level: decreased  Left Sidegliding: repeated movements  Pain location: centralized  Pain intensity: worse  Pain level: increased  Right sidegliding: repeated movements  Pain location: centralized  Pain intensity:worse  Pain level: increased    Strength/Myotome Testing     Left Hip   Planes of Motion   Flexion: 4  Extension: 4+  Abduction: 4+  Adduction: 5  External rotation: 4-  Internal rotation: 4    Right Hip   Planes of Motion   Flexion: 4  Extension: 4+  Abduction: 4+  Adduction: 5  External rotation: 4  Internal rotation: 4    Left Knee   Flexion: 4  Extension: 4  Quadriceps contraction: fair    Right Knee   Flexion: 4  Extension: 4  Quadriceps contraction: fair    Tests     Left Hip   Jamar: Positive       Right Hip   Kenzie Peralta: Positive                   Precautions:  Chronic bilateral knee pain         Manuals 6/9  6/15  7/12 5/19 6/7   Bilateral HS, piriformis, quad, ITB and gastroc        12min   Manual lumbar traction with PA mobs     5 min    3 min traction only    Leg length discrepancy correction  15 min  15 min  Anteriorly rotated left side           MFR to lumbar paraspinals    15'                   Neuro Re-Ed             Stand Lumbar extension against bar 10x 5' hold  10x 5" hold        Stand OAL  10x 5" hold  10x 5" hold 10x 5" hold  10x5" hold  10x 5" hold   PPU with exhale  10x  10x        Bridge with Tball    10x 2x10      Supine Tball ABD crunch    10x each 10x each      Seated Tball PNF and trunk rotation   2x10  1# 15x each  15x ea 15x each    Seated hip march and LAQ on physio ball    2x10 red each  2x10 each  20x ea 2x10 each    Stan flossing    3x 10 pumps bilat    3x 10 pumps bilat  Missed                                                            Ther Ex             Treadmill 10 min  2 0 mph  10 min   1 5 mph   10 min 1 5 mph  10 min  1 5 mphy   Tball LTR    10x  10x    10x 3" hold    Core stabalization: side step/arms out hold T-band   10x each way single blue band  10x each Single Blue Band 10x each  Way single blue band  10x each way single blue band                                                            HEP update/review              Ther Activity                                         Gait Training                                         Modalities             MHP to the LB in seated 15 min  15 min   15 min 15 min

## 2021-07-14 ENCOUNTER — OFFICE VISIT (OUTPATIENT)
Dept: PHYSICAL THERAPY | Facility: CLINIC | Age: 46
End: 2021-07-14
Payer: COMMERCIAL

## 2021-07-14 DIAGNOSIS — M54.41 CHRONIC BILATERAL LOW BACK PAIN WITH BILATERAL SCIATICA: Primary | ICD-10-CM

## 2021-07-14 DIAGNOSIS — G89.29 CHRONIC BILATERAL LOW BACK PAIN WITH BILATERAL SCIATICA: Primary | ICD-10-CM

## 2021-07-14 DIAGNOSIS — M54.42 CHRONIC BILATERAL LOW BACK PAIN WITH BILATERAL SCIATICA: Primary | ICD-10-CM

## 2021-07-14 PROCEDURE — 97140 MANUAL THERAPY 1/> REGIONS: CPT

## 2021-07-14 PROCEDURE — 97110 THERAPEUTIC EXERCISES: CPT

## 2021-07-14 PROCEDURE — 97112 NEUROMUSCULAR REEDUCATION: CPT

## 2021-07-14 NOTE — PROGRESS NOTES
Daily Note     Today's date: 2021  Patient name: Cyrus Halsted  : 1975  MRN: [id number]  Referring provider: Marc Ledesma DO  Dx:   Encounter Diagnosis     ICD-10-CM    1  Chronic bilateral low back pain with bilateral sciatica  M54 42     M54 41     G89 29                   Subjective: patient stated by the time she went home form therapy she was getting pain in lower left back  Later in day she bend over to move seat in car and felt a slipping in her back causing increased pain lasting into today  Pain in 7/10      Objective: See treatment diary below      Assessment: patient tested positive for leg length discrepancy on left anteriorly rotated   Modified treatment due to aforementioned subjective comment  Will continue to monitor pain levels and level of endurance and adjust program as needed in upcoming visits  Patient would benefit from continued therapy to decrease pain and increase strength and flexibility to improve LOF      Plan: Continue per plan of care  Progress treatment as tolerated  Precautions:  Chronic bilateral knee pain       Manuals 6/9  6/15 7/14  5/19 6/7   Bilateral HS, piriformis, quad, ITB and gastroc       12 min  12min   Manual lumbar traction with PA mobs     3 min   Traction only 3 min traction only    Leg length discrepancy correction  15 min  15 min  Anteriorly rotated left side    25 min  Anterior left                Neuro Re-Ed         Stand Lumbar extension against bar 10x 5' hold  10x 5" hold    10x  10x   Stand OAL  10x 5" hold  10x 5" hold   10x 5" hold 10x 5" hold   PPU with exhale  10x  10x   10x    Bridge with Tball   10x   10x 10x   Supine Tball ABD crunch   10x each 10x each  10x each 10x each   Seated Tball PNF and trunk rotation  2x10  1# 2x10 each   1#  15x each 2x10  1#   Seated hip march and LAQ on physio ball   2x10 red each  2x10 each   2x10 each 2x10   Red wt ball    Stan flossing   3x 10 pumps bilat    3x 10 pumps bilat  Missed    core                                    Ther Ex         Treadmill 10 min  2 0 mph  10 min   1 5 mph  SRC  10 min  L1  10 min  2 0 mph 10 min  1 5 mphy   Tball LTR   10x    10x 3" hold  10x 3" hold    Core stabalization: standing chest press using T-band   (palov press)         Core stabalization: side step/arms out hold T-band  10x each way single blue band 10x each way single blue band   10x each  Way single blue band  10x each way single blue band    Abdominal curls with physio ball    15x 3" hold                                  HEP update/review          Ther Activity                           Gait Training                           Modalities         MHP to the LB in seated 15 min  15 min   15 min 15 min

## 2021-07-19 ENCOUNTER — OFFICE VISIT (OUTPATIENT)
Dept: PHYSICAL THERAPY | Facility: CLINIC | Age: 46
End: 2021-07-19
Payer: COMMERCIAL

## 2021-07-19 DIAGNOSIS — M54.42 CHRONIC BILATERAL LOW BACK PAIN WITH BILATERAL SCIATICA: Primary | ICD-10-CM

## 2021-07-19 DIAGNOSIS — M54.41 CHRONIC BILATERAL LOW BACK PAIN WITH BILATERAL SCIATICA: Primary | ICD-10-CM

## 2021-07-19 DIAGNOSIS — G89.29 CHRONIC BILATERAL LOW BACK PAIN WITH BILATERAL SCIATICA: Primary | ICD-10-CM

## 2021-07-19 PROCEDURE — 97140 MANUAL THERAPY 1/> REGIONS: CPT

## 2021-07-19 PROCEDURE — 97110 THERAPEUTIC EXERCISES: CPT

## 2021-07-19 NOTE — PROGRESS NOTES
Daily Note     Today's date: 2021  Patient name: Moises George  : 1975  MRN: [id number]  Referring provider: Zacarias Enamorado DO  Dx:   Encounter Diagnosis     ICD-10-CM    1  Chronic bilateral low back pain with bilateral sciatica  M54 42     M54 41     G89 29                   Subjective: patient stated her left lower back is acting up today  She stated she was good after this PTA corrected her leg length discrepancy         Objective: See treatment diary below      Assessment: patient tested positive for leg length discrepancy on left anteriorly rotated needing manual correction  Patient would benefit from continued therapy focusing on core strengthening to stabilize pelvis  Plan: Continue per plan of care  Progress treatment as tolerated  Precautions:  Chronic bilateral knee pain       Manuals 6/9  6/15 7/14 7/19  6/7   Bilateral HS, piriformis, quad, ITB and gastroc        12min   Manual lumbar traction with PA mobs      3 min traction only    Leg length discrepancy correction  15 min  15 min  Anteriorly rotated left side    25 min  Anterior left  15 min              Neuro Re-Ed         Stand Lumbar extension against bar 10x 5' hold  10x 5" hold     10x   Stand OAL  10x 5" hold  10x 5" hold    10x 5" hold   PPU with exhale  10x  10x       Bridge with Tball   10x    10x   Supine Tball ABD crunch   10x each 10x each 15x  10x each   Seated Tball PNF and trunk rotation  2x10  1# 2x10 each   2x10 each  2x10  1#   Seated hip march and LAQ on physio ball   2x10 red each  2x10 each  1#  2x10 each  1#  2x10   Red wt ball    Stan flossing   3x 10 pumps bilat     Missed    core                                    Ther Ex         Treadmill 10 min  2 0 mph  10 min   1 5 mph  SRC  10 min  L1   10 min  1 5 mphy   Tball LTR   10x     10x 3" hold    Core stabalization: standing chest press using T-band   (palov press)    10x single blue band     Core stabalization: side step/arms out hold T-band  10x each way single blue band 10x each way single blue band  10x each way single blue band   10x each way single blue band    Abdominal curls with physio ball    15x 3" hold  15x 3" hold                                 HEP update/review          Ther Activity                           Gait Training                           Modalities         MHP to the LB in seated 15 min  15 min    15 min

## 2021-07-21 ENCOUNTER — OFFICE VISIT (OUTPATIENT)
Dept: PHYSICAL THERAPY | Facility: CLINIC | Age: 46
End: 2021-07-21
Payer: COMMERCIAL

## 2021-07-21 DIAGNOSIS — M54.42 CHRONIC BILATERAL LOW BACK PAIN WITH BILATERAL SCIATICA: Primary | ICD-10-CM

## 2021-07-21 DIAGNOSIS — M54.41 CHRONIC BILATERAL LOW BACK PAIN WITH BILATERAL SCIATICA: Primary | ICD-10-CM

## 2021-07-21 DIAGNOSIS — G89.29 CHRONIC BILATERAL LOW BACK PAIN WITH BILATERAL SCIATICA: Primary | ICD-10-CM

## 2021-07-21 PROCEDURE — 97110 THERAPEUTIC EXERCISES: CPT

## 2021-07-21 PROCEDURE — 97140 MANUAL THERAPY 1/> REGIONS: CPT

## 2021-07-21 NOTE — PROGRESS NOTES
Daily Note     Today's date: 2021  Patient name: Army Dumont  : 1975  MRN: [id number]  Referring provider: Selena Moreno DO  Dx:   Encounter Diagnosis     ICD-10-CM    1  Chronic bilateral low back pain with bilateral sciatica  M54 42     M54 41     G89 29                   Subjective: patient stated she turned sitting in the car and felt the herself going out of alignment  Objective: See treatment diary below      Assessment: patient tested positive for leg length discrepancy on left anteriorly rotated needing manual correction  Patient was educated on how to self correct her leg length discrepancy  Patient would benefit from continued therapy focusing on core strengthening to stabilize pelvis      Plan: Continue per plan of care  Progress treatment as tolerated  Precautions:  Chronic bilateral knee pain       Manuals 6/9  6/15 7/14 7/19 7/21    Bilateral HS, piriformis, quad, ITB and gastroc           Manual lumbar traction with PA mobs         Leg length discrepancy correction  15 min  15 min  Anteriorly rotated left side    25 min  Anterior left  15 min 15 min             Neuro Re-Ed         Stand Lumbar extension against bar 10x 5' hold  10x 5" hold        Stand OAL  10x 5" hold  10x 5" hold       PPU with exhale  10x  10x       Bridge with Tball   10x       Supine Tball ABD crunch   10x each 10x each 15x 15x    Seated Tball PNF and trunk rotation  2x10  1# 2x10 each   2x10 each 2x10 each    Seated hip march and LAQ on physio ball   2x10 red each  2x10 each  1#  2x10 each  1# 2x10 each  1#    Stan flossing   3x 10 pumps bilat        core                                    Ther Ex         Treadmill 10 min  2 0 mph  10 min   1 5 mph  SRC  10 min  L1  SRC 10 min L3    Tball LTR   10x    10x    Core stabalization: standing chest press using T-band   (palov press)    10x single blue band 10x single blue band     Core stabalization: side step/arms out hold T-band  10x each way single blue band 10x each way single blue band  10x each way single blue band  10x each  Way single  Blue band     Abdominal curls with physio ball    15x 3" hold  15x 3" hold  15x 3" hold                                HEP update/review          Ther Activity                           Gait Training                           Modalities         MHP to the LB in seated 15 min  15 min

## 2021-07-27 ENCOUNTER — OFFICE VISIT (OUTPATIENT)
Dept: PHYSICAL THERAPY | Facility: CLINIC | Age: 46
End: 2021-07-27
Payer: COMMERCIAL

## 2021-07-27 DIAGNOSIS — G89.29 CHRONIC BILATERAL LOW BACK PAIN WITH BILATERAL SCIATICA: Primary | ICD-10-CM

## 2021-07-27 DIAGNOSIS — M54.42 CHRONIC BILATERAL LOW BACK PAIN WITH BILATERAL SCIATICA: Primary | ICD-10-CM

## 2021-07-27 DIAGNOSIS — M54.41 CHRONIC BILATERAL LOW BACK PAIN WITH BILATERAL SCIATICA: Primary | ICD-10-CM

## 2021-07-27 PROCEDURE — 97112 NEUROMUSCULAR REEDUCATION: CPT

## 2021-07-27 PROCEDURE — 97110 THERAPEUTIC EXERCISES: CPT

## 2021-07-27 PROCEDURE — 97140 MANUAL THERAPY 1/> REGIONS: CPT

## 2021-07-27 NOTE — PROGRESS NOTES
Daily Note     Today's date: 2021  Patient name: Jimenez Fowler  : 1975  MRN: [id number]  Referring provider: Renetta Degroot DO  Dx:   Encounter Diagnosis     ICD-10-CM    1  Chronic bilateral low back pain with bilateral sciatica  M54 42     M54 41     G89 29                   Subjective: patient stated, " I feel like my hips have stayed in since last visit "      Objective: See treatment diary below      Assessment: patient able to progress slightly with program focusing on core strengthening  Good execution of exercise with program  Patient would benefit from continued therapy to decrease pain and increase strength and flexibility to improve overall LOF      Plan: Continue per plan of care  Progress treatment as tolerated  Precautions:  Chronic bilateral knee pain       Manuals 6/9  6/15 7/14 7/19 7/21 7/27   Bilateral HS, piriformis, quad, ITB and gastroc        15 min   Manual lumbar traction with PA mobs         Leg length discrepancy correction  15 min  15 min  Anteriorly rotated left side    25 min  Anterior left  15 min 15 min             Neuro Re-Ed         Stand Lumbar extension against bar 10x 5' hold  10x 5" hold        Stand OAL  10x 5" hold  10x 5" hold       PPU with exhale  10x  10x       Bridge with Tball   10x       Supine Tball ABD crunch   10x each 10x each 15x 15x 15x   Seated Tball PNF and trunk rotation  2x10  1# 2x10 each   2x10 each 2x10 each 2x10 each  Red med ball     Seated hip march and LAQ on physio ball   2x10 red each  2x10 each  1#  2x10 each  1# 2x10 each  1# 2x10  1 5#   Stan flossing   3x 10 pumps bilat                                            Ther Ex         Treadmill 10 min  2 0 mph  10 min   1 5 mph  SRC  10 min  L1  SRC 10 min L3 10 min  2 0 mph    Tball LTR   10x    10x 10x   Core stabalization: standing chest press using T-band   (palov press)    10x single blue band 10x single blue band  10x single black band   Core stabalization: side step/arms out hold T-band  10x each way single blue band 10x each way single blue band  10x each way single blue band  10x each  Way single  Blue band  10x each  Way single blue band    Abdominal curls with physio ball    15x 3" hold  15x 3" hold  15x 3" hold  15x 3" hold                               HEP update/review          Ther Activity                           Gait Training                           Modalities         MHP to the LB in seated 15 min  15 min

## 2021-07-28 ENCOUNTER — OFFICE VISIT (OUTPATIENT)
Dept: OBGYN CLINIC | Facility: MEDICAL CENTER | Age: 46
End: 2021-07-28
Payer: COMMERCIAL

## 2021-07-28 VITALS
BODY MASS INDEX: 22.02 KG/M2 | HEIGHT: 66 IN | WEIGHT: 137 LBS | SYSTOLIC BLOOD PRESSURE: 99 MMHG | DIASTOLIC BLOOD PRESSURE: 65 MMHG | HEART RATE: 71 BPM

## 2021-07-28 DIAGNOSIS — Q76.49 BERTOLOTTI'S SYNDROME: Primary | ICD-10-CM

## 2021-07-28 DIAGNOSIS — M54.41 CHRONIC BILATERAL LOW BACK PAIN WITH BILATERAL SCIATICA: ICD-10-CM

## 2021-07-28 DIAGNOSIS — M54.42 CHRONIC BILATERAL LOW BACK PAIN WITH BILATERAL SCIATICA: ICD-10-CM

## 2021-07-28 DIAGNOSIS — G89.29 CHRONIC LEFT-SIDED LOW BACK PAIN WITHOUT SCIATICA: ICD-10-CM

## 2021-07-28 DIAGNOSIS — G89.29 CHRONIC BILATERAL LOW BACK PAIN WITH BILATERAL SCIATICA: ICD-10-CM

## 2021-07-28 DIAGNOSIS — M54.50 CHRONIC LEFT-SIDED LOW BACK PAIN WITHOUT SCIATICA: ICD-10-CM

## 2021-07-28 PROCEDURE — 99213 OFFICE O/P EST LOW 20 MIN: CPT | Performed by: PHYSICAL MEDICINE & REHABILITATION

## 2021-07-28 RX ORDER — CYCLOBENZAPRINE HCL 10 MG
10 TABLET ORAL
Qty: 30 TABLET | Refills: 0 | Status: SHIPPED | OUTPATIENT
Start: 2021-07-28

## 2021-07-28 NOTE — PROGRESS NOTES
1  Bertolotti's syndrome  MRI lumbar spine wo contrast   2  Chronic left-sided low back pain without sciatica  MRI lumbar spine wo contrast   3  Chronic bilateral low back pain with bilateral sciatica  cyclobenzaprine (FLEXERIL) 10 mg tablet     Orders Placed This Encounter   Procedures    MRI lumbar spine wo contrast        Impression:  Patient is here in follow up of low back pain that is multifactorial and is predominantly due to Bertolotti syndrome/biomechanical SI dysfunction and myofascial spasticity/strain and postural/core instability  There are no concerning neurological deficits      Patient has findings consistent with spina bifida occulta on her lumbar spine x-rays  We discussed that this is something that has been with her since birth      Patient has been going through physical therapy in using cyclobenzaprine and medrol dose pack       Patient reports rash to most NSAID's       The patient continues to have left-sided low-back pain  It is severe enough and affecting her activities of daily living  At this juncture, we will obtain an MRI of her lumbar spine  Can consider having her see pain management for an SI joint injection  I will see her back after the MRI  Imaging Studies (I personally reviewed images in PACS and report):  Lumbar spine x-rays that are most recent to this encounter were reviewed  These images show findings consistent with spina bifida occulta  There is lumbarization of the 1st sacral segment  There is disc space narrowing in the last two disc spaces  There are no acute osseous abnormalities  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  No follow-ups on file  HPI:  Leigh Santo is a 50 y o  female  who presents in follow up  Here for   Chief Complaint   Patient presents with    Spine - Follow-up       Date of injury: Chronic  Trajectory of symptoms: She feels 25% improved since her last visit    She feels that her leg moves out of place and physical therapy is helpful for her  She feels it when her hip moves out of place  It happens more than she would like  Patient is accompanied by nobody  Review of Systems   Constitutional: Positive for activity change  Negative for fever  HENT: Negative for trouble swallowing  Eyes: Negative for visual disturbance  Respiratory: Negative for shortness of breath  Cardiovascular: Negative for chest pain  Gastrointestinal: Negative for abdominal pain  Denies bowel incontinence  Endocrine: Negative for polydipsia  Genitourinary:        Denies urinary incontinence  Musculoskeletal: Positive for back pain  Negative for gait problem  Skin: Negative for rash  Allergic/Immunologic: Negative for immunocompromised state  Neurological: Negative for weakness and numbness  Denies saddle anesthesia  Hematological: Does not bruise/bleed easily  Psychiatric/Behavioral: Negative for confusion  Following history reviewed and updated:  Past Medical History:   Diagnosis Date    Environmental and seasonal allergies     Fibromyalgia     Lumbar dislocation     chronic pain    Osteoarthritis     Painful orthopaedic hardware ([address] )     PONV (postoperative nausea and vomiting)     severe patient states the patch helps     Past Surgical History:   Procedure Laterality Date    KNEE SURGERY Bilateral     6 left knee sx, 4 right knee sx    [place] REMOVAL DEEP IMPLANT Right 5/2/2016    Procedure: REMOVAL HARDWARE PATELLA;  Surgeon: Loraine Stafford MD;  Location: [facility];  Service: Orthopedics     Social History   Social History     Substance and Sexual Activity   Alcohol Use Yes    Comment: social     Social History     Substance and Sexual Activity   Drug Use No     Social History     Tobacco Use   Smoking Status Never Smoker   Smokeless Tobacco Never Used     History reviewed  No pertinent family history    Allergies   Allergen Reactions    Sertraline Hives     Annotation - [id number]: Hives      Ultram [Tramadol] Hives    Xanax [Alprazolam]      insomnia    Zoloft [Sertraline Hcl] Hives    Duloxetine Rash    Nortriptyline Rash    Nsaids Rash    Tolmetin Rash        Constitutional:  BP 99/65 (BP Location: Right arm, Patient Position: Sitting, Cuff Size: Standard)   Pulse 71   Ht 5' 6" (1 676 m)   Wt 62 1 kg (137 lb)   BMI 22 11 kg/m²    General: NAD  Eyes: Clear sclerae  ENT: No inflammation, lesion, or mass of lips  No tracheal deviation  Musculoskeletal: As mentioned below  Integumentary: No visible rashes or skin lesions  Pulmonary/Chest: Effort normal  No respiratory distress  Neuro: CN's grossly intact, AG  Psych: Normal affect and judgement  Vascular: WWP  Back Exam     Tenderness   The patient is experiencing tenderness in the sacroiliac  Muscle Strength   The patient has normal back strength  Other   Sensation: normal  Gait: normal   Erythema: no back redness  Scars: absent    Comments:  Positive fabere for anterior pain               Procedures

## 2021-07-30 ENCOUNTER — OFFICE VISIT (OUTPATIENT)
Dept: PHYSICAL THERAPY | Facility: CLINIC | Age: 46
End: 2021-07-30
Payer: COMMERCIAL

## 2021-07-30 DIAGNOSIS — M54.42 CHRONIC BILATERAL LOW BACK PAIN WITH BILATERAL SCIATICA: Primary | ICD-10-CM

## 2021-07-30 DIAGNOSIS — M54.41 CHRONIC BILATERAL LOW BACK PAIN WITH BILATERAL SCIATICA: Primary | ICD-10-CM

## 2021-07-30 DIAGNOSIS — G89.29 CHRONIC BILATERAL LOW BACK PAIN WITH BILATERAL SCIATICA: Primary | ICD-10-CM

## 2021-07-30 PROCEDURE — 97140 MANUAL THERAPY 1/> REGIONS: CPT

## 2021-07-30 PROCEDURE — 97110 THERAPEUTIC EXERCISES: CPT

## 2021-07-30 NOTE — PROGRESS NOTES
Daily Note     Today's date: 2021  Patient name: Kwadwo Pelayo  : 1975  MRN: [id number]  Referring provider: Woody Virgen DO  Dx:   Encounter Diagnosis     ICD-10-CM    1  Chronic bilateral low back pain with bilateral sciatica  M54 42     M54 41     G89 29                   Subjective: Patient continues with LBP  She had a follow up with her physician this week and an MRI has been scheduled in August       Objective: See treatment diary below      Assessment: Tolerated treatment well  Patient exhibited good technique with therapeutic exercises      Plan: Continue per plan of care  Precautions:  Chronic bilateral knee pain       Manuals 7/30 6/15 7/14 7/19 7/21 7/27   Bilateral HS, piriformis, quad, ITB and gastroc        15 min   Manual lumbar traction with PA mobs         Leg length discrepancy correction  15 min  Anterior L 15 min  Anteriorly rotated left side    25 min  Anterior left  15 min 15 min             Neuro Re-Ed         Stand Lumbar extension against bar 10x 10x 5" hold        Stand OAL  10x 10x 5" hold       PPU with exhale  10x 10x       Bridge with Tball  10x 10x       Supine Tball ABD crunch  15x 10x each 10x each 15x 15x 15x   Seated Tball PNF and trunk rotation 2x10 RMB 2x10  1# 2x10 each   2x10 each 2x10 each 2x10 each  Red med ball     Seated hip march and LAQ on physio ball  2x10 1 5# 2x10 red each  2x10 each  1#  2x10 each  1# 2x10 each  1# 2x10  1 5#   Stan flossing   3x 10 pumps bilat                                            Ther Ex         Treadmill 10 min  2 0 mph  10 min   1 5 mph  SRC  10 min  L1  SRC 10 min L3 10 min  2 0 mph    Tball LTR  10x 10x    10x 10x   Core stabalization: standing chest press using T-band   (palov press) 10x Black   10x single blue band 10x single blue band  10x single black band   Core stabalization: side step/arms out hold T-band 10x ea Blue 10x each way single blue band 10x each way single blue band  10x each way single blue band 10x each  Way single  Blue band  10x each  Way single blue band    Abdominal curls with physio ball  missed  15x 3" hold  15x 3" hold  15x 3" hold  15x 3" hold                               HEP update/review          Ther Activity                           Gait Training                           Modalities         MHP to the LB in seated 15 min  15 min

## 2021-08-17 ENCOUNTER — TELEPHONE (OUTPATIENT)
Dept: OBGYN CLINIC | Facility: MEDICAL CENTER | Age: 46
End: 2021-08-17

## 2021-08-17 NOTE — TELEPHONE ENCOUNTER
Patient sees Dr Bailey Holley  Patient calling to check on the MRI auth status  Please call to update her if any more news on her authorization       #  [phone]

## 2021-08-18 ENCOUNTER — OFFICE VISIT (OUTPATIENT)
Dept: PHYSICAL THERAPY | Facility: CLINIC | Age: 46
End: 2021-08-18
Payer: COMMERCIAL

## 2021-08-18 DIAGNOSIS — M54.41 CHRONIC BILATERAL LOW BACK PAIN WITH BILATERAL SCIATICA: Primary | ICD-10-CM

## 2021-08-18 DIAGNOSIS — M54.42 CHRONIC BILATERAL LOW BACK PAIN WITH BILATERAL SCIATICA: Primary | ICD-10-CM

## 2021-08-18 DIAGNOSIS — G89.29 CHRONIC BILATERAL LOW BACK PAIN WITH BILATERAL SCIATICA: Primary | ICD-10-CM

## 2021-08-18 PROCEDURE — 97140 MANUAL THERAPY 1/> REGIONS: CPT | Performed by: PHYSICAL THERAPIST

## 2021-08-18 PROCEDURE — 97112 NEUROMUSCULAR REEDUCATION: CPT | Performed by: PHYSICAL THERAPIST

## 2021-08-18 NOTE — PROGRESS NOTES
Daily Note     Today's date: 2021  Patient name: Ledy Beck  : 1975  MRN: [id number]  Referring provider: Rico Colon DO  Dx:   Encounter Diagnosis     ICD-10-CM    1  Chronic bilateral low back pain with bilateral sciatica  M54 42     M54 41     G89 29                   Subjective: Pt reports her R foot has been numb the last few days      Objective: See treatment diary below      Assessment: Tolerated treatment well  Patient would benefit from continued PT      Plan: Progress treatment as tolerated         Precautions:  Chronic bilateral knee pain     /  Manuals    Bilateral HS, piriformis, quad, ITB and gastroc       15 min   Manual lumbar traction with PA mobs         Leg length discrepancy correction  15 min  Anterior L Right Upslip  15 min 15 min             Neuro Re-Ed         Stand Lumbar extension against bar 10x 10x       Stand OAL  10x 10x       PPU with exhale  10x 10x       Bridge with Tball  10x 10x       Supine Tball ABD crunch  15x 15x  15x 15x 15x   Seated Tball PNF and trunk rotation 2x10 RMB 2x10 RMB  2x10 each 2x10 each 2x10 each  Red med ball     Seated hip march and LAQ on physio ball  2x10 1 5# 2x10 1 5#  2x10 each  1# 2x10 each  1# 2x10  1 5#   Stan flossing                                              Ther Ex         Treadmill 10 min  2 0 mph  10 min  2 0 mph   SRC 10 min L3 10 min  2 0 mph    Tball LTR  10x 10x   10x 10x   Core stabalization: standing chest press using T-band   (palov press) 10x Black 10x Black  10x single blue band 10x single blue band  10x single black band   Core stabalization: side step/arms out hold T-band 10x ea Blue 10x ea single Blue  10x each way single blue band  10x each  Way single  Blue band  10x each  Way single blue band    Abdominal curls with physio ball  missed   15x 3" hold  15x 3" hold  15x 3" hold                               HEP update/review          Ther Activity                           Gait Training Modalities         MHP to the LB in seated 15 min

## 2021-08-23 ENCOUNTER — APPOINTMENT (OUTPATIENT)
Dept: PHYSICAL THERAPY | Facility: CLINIC | Age: 46
End: 2021-08-23
Payer: COMMERCIAL

## 2021-08-25 ENCOUNTER — OFFICE VISIT (OUTPATIENT)
Dept: PHYSICAL THERAPY | Facility: CLINIC | Age: 46
End: 2021-08-25
Payer: COMMERCIAL

## 2021-08-25 DIAGNOSIS — G89.29 CHRONIC BILATERAL LOW BACK PAIN WITH BILATERAL SCIATICA: Primary | ICD-10-CM

## 2021-08-25 DIAGNOSIS — M54.41 CHRONIC BILATERAL LOW BACK PAIN WITH BILATERAL SCIATICA: Primary | ICD-10-CM

## 2021-08-25 DIAGNOSIS — M54.42 CHRONIC BILATERAL LOW BACK PAIN WITH BILATERAL SCIATICA: Primary | ICD-10-CM

## 2021-08-25 PROCEDURE — 97110 THERAPEUTIC EXERCISES: CPT

## 2021-08-25 PROCEDURE — 97112 NEUROMUSCULAR REEDUCATION: CPT

## 2021-08-25 PROCEDURE — 97140 MANUAL THERAPY 1/> REGIONS: CPT

## 2021-08-25 NOTE — PROGRESS NOTES
Daily Note     Today's date: 2021  Patient name: Lauren Artis  : 1975  MRN: [id number]  Referring provider: Hazel Molina DO  Dx:   Encounter Diagnosis     ICD-10-CM    1  Chronic bilateral low back pain with bilateral sciatica  M54 42     M54 41     G89 29                   Subjective: patient c/o pain in let low back       Objective: See treatment diary below      Assessment: patient was negative for leg length discrepancy  Patient tolerated treatment well  No exacerbation of sx with program  Patient able to complete all tasks assigned with good execution  Patient would benefit from continued therapy to decrease pain and increase strength and flexibility to improve LOF  Plan: Continue per plan of care  Progress treatment as tolerated         Precautions:  Chronic bilateral knee pain     /  Manuals    Bilateral HS, piriformis, quad, ITB and gastroc       15 min   Manual lumbar traction with PA mobs         Leg length discrepancy correction  15 min  Anterior L Right Upslip   15 min             Neuro Re-Ed         Stand Lumbar extension against bar 10x 10x 10x      Stand OAL  10x 10x 10x      PPU with exhale  10x 10x 10x      Bridge with Tball  10x 10x 10x      Supine Tball ABD crunch  15x 15x 15x  15x 15x   Seated Tball PNF and trunk rotation 2x10 RMB 2x10 RMB 2x10  RMB  2x10 each 2x10 each  Red med ball     Seated hip march and LAQ on physio ball  2x10 1 5# 2x10 1 5# 2x10  1 5#  2x10 each  1# 2x10  1 5#   Stan flossing                                              Ther Ex         Treadmill 10 min  2 0 mph  10 min  2 0 mph 10 min   2 0 mph   SRC 10 min L3 10 min  2 0 mph    Tball LTR  10x 10x 10x  10x 10x   Core stabalization: standing chest press using T-band   (palov press) 10x Black 10x Black 10x black   10x single blue band  10x single black band   Core stabalization: side step/arms out hold T-band 10x ea Blue 10x ea single Blue 10x ea  Single blue  10x each  Way single  Blue band  10x each  Way single blue band    Abdominal curls with physio ball  missed    15x 3" hold  15x 3" hold                               HEP update/review          Ther Activity                           Gait Training                           Modalities         MHP to the LB in seated 15 min

## 2021-08-31 ENCOUNTER — TELEPHONE (OUTPATIENT)
Dept: OBGYN CLINIC | Facility: CLINIC | Age: 46
End: 2021-08-31

## 2021-09-07 ENCOUNTER — OFFICE VISIT (OUTPATIENT)
Dept: OBGYN CLINIC | Facility: CLINIC | Age: 46
End: 2021-09-07
Payer: COMMERCIAL

## 2021-09-07 VITALS
SYSTOLIC BLOOD PRESSURE: 114 MMHG | DIASTOLIC BLOOD PRESSURE: 75 MMHG | BODY MASS INDEX: 22.18 KG/M2 | HEART RATE: 74 BPM | HEIGHT: 66 IN | WEIGHT: 138 LBS

## 2021-09-07 DIAGNOSIS — M54.41 CHRONIC BILATERAL LOW BACK PAIN WITH BILATERAL SCIATICA: Primary | ICD-10-CM

## 2021-09-07 DIAGNOSIS — Q76.49 BERTOLOTTI'S SYNDROME: ICD-10-CM

## 2021-09-07 DIAGNOSIS — G89.29 CHRONIC BILATERAL LOW BACK PAIN WITH BILATERAL SCIATICA: Primary | ICD-10-CM

## 2021-09-07 DIAGNOSIS — M54.42 CHRONIC BILATERAL LOW BACK PAIN WITH BILATERAL SCIATICA: Primary | ICD-10-CM

## 2021-09-07 PROCEDURE — 99214 OFFICE O/P EST MOD 30 MIN: CPT | Performed by: PHYSICAL MEDICINE & REHABILITATION

## 2021-09-07 RX ORDER — GABAPENTIN 100 MG/1
100 CAPSULE ORAL
Qty: 90 CAPSULE | Refills: 0 | Status: SHIPPED | OUTPATIENT
Start: 2021-09-07

## 2021-09-07 NOTE — PROGRESS NOTES
1  Chronic bilateral low back pain with bilateral sciatica  MRI lumbar spine wo contrast    gabapentin (NEURONTIN) 100 mg capsule    Ambulatory referral to Pain Management   2  Bertolotti's syndrome  MRI lumbar spine wo contrast    gabapentin (NEURONTIN) 100 mg capsule    Ambulatory referral to Pain Management     Orders Placed This Encounter   Procedures    MRI lumbar spine wo contrast    Ambulatory referral to Pain Management     Impression:  Patient is here in follow up of low back pain that is multifactorial and is predominantly due to Bertolotti syndrome/biomechanical SI dysfunction and possibly lumbar radiculopathy      Patient has gone through over two months of physical therapy, home exercise program   She has also been using Tylenol and has already gone through steroid burst and different muscle relaxants  She is unable to take NSAIDs due to allergies      The patient continues to have low-back pain  It is severe enough and affecting her activities of daily living  At this juncture, we will reorder an MRI of her lumbar spine  Can consider having her see pain management for possible interventional spine procedures  I have also prescribed her low-dose gabapentin which she will increase as we discussed  I will call the patient with her MRI results  Imaging Studies (I personally reviewed images in PACS and report):  Lumbar spine x-rays that are most recent to this encounter were reviewed  Tamika Beasley images show findings consistent with spina bifida occulta   There is sacralization of the 5th lumbar vertebrae consistent with Bertolotti syndrome   There is lower lumbar disc space narrowing   There are no acute osseous abnormalities  Return if symptoms worsen or fail to improve  Patient is in agreement with the above plan  HPI:  Noemi Gonzalez is a 50 y o  female  who presents in follow up    Here for   Chief Complaint   Patient presents with    Lower Back - Pain, Follow-up       Date of injury:  Chronic  Trajectory of symptoms: No changes since her last visit  She gets numbness in her right foot  The numbness is in all of her toes  The pain is across her low back and radiates into the hips  She has been going to physical therapy for over 2 months and doing a home exercise program   She has also finished steroids, NSAID's, muscle relaxants and activity modification  Review of Systems   Constitutional: Positive for activity change  Negative for fever  HENT: Negative for trouble swallowing  Eyes: Negative for visual disturbance  Respiratory: Negative for shortness of breath  Cardiovascular: Negative for chest pain  Gastrointestinal: Negative for abdominal pain  Denies bowel incontinence  Endocrine: Negative for polydipsia  Genitourinary:        Denies urinary incontinence  Musculoskeletal: Positive for back pain  Negative for gait problem  Skin: Negative for rash  Allergic/Immunologic: Negative for immunocompromised state  Neurological: Negative for weakness and numbness  Denies saddle anesthesia  Hematological: Does not bruise/bleed easily  Psychiatric/Behavioral: Negative for confusion         Following history reviewed and updated:  Past Medical History:   Diagnosis Date    Environmental and seasonal allergies     Fibromyalgia     Lumbar dislocation     chronic pain    Osteoarthritis     Painful orthopaedic hardware ([address] )     PONV (postoperative nausea and vomiting)     severe patient states the patch helps     Past Surgical History:   Procedure Laterality Date    KNEE SURGERY Bilateral     6 left knee sx, 4 right knee sx    [place] REMOVAL DEEP IMPLANT Right 5/2/2016    Procedure: REMOVAL HARDWARE PATELLA;  Surgeon: Domenic Contreras MD;  Location: [facility];  Service: Orthopedics     Social History   Social History     Substance and Sexual Activity   Alcohol Use Yes    Comment: social     Social History     Substance and Sexual Activity   Drug Use No     Social History     Tobacco Use   Smoking Status Never Smoker   Smokeless Tobacco Never Used     History reviewed  No pertinent family history  Allergies   Allergen Reactions    Sertraline Hives     Annotation - [id number]: Hives      Ultram [Tramadol] Hives    Xanax [Alprazolam]      insomnia    Zoloft [Sertraline Hcl] Hives    Duloxetine Rash    Nortriptyline Rash    Nsaids Rash    Tolmetin Rash        Constitutional:  /75   Pulse 74   Ht 5' 6" (1 676 m)   Wt 62 6 kg (138 lb)   BMI 22 27 kg/m²    General: NAD  Eyes: Clear sclerae  ENT: No inflammation, lesion, or mass of lips  No tracheal deviation  Musculoskeletal: As mentioned below  Integumentary: No visible rashes or skin lesions  Pulmonary/Chest: Effort normal  No respiratory distress  Neuro: CN's grossly intact, AG  Psych: Normal affect and judgement  Vascular: WWP  Right Hip Exam     Tenderness   The patient is experiencing tenderness in the greater trochanter  Left Hip Exam     Tenderness   The patient is experiencing tenderness in the greater trochanter  Back Exam     Tenderness   The patient is experiencing tenderness in the sacroiliac and lumbar  Muscle Strength   The patient has normal back strength      Other   Sensation: normal  Gait: normal              Procedures

## 2021-09-24 ENCOUNTER — HOSPITAL ENCOUNTER (OUTPATIENT)
Dept: RADIOLOGY | Facility: HOSPITAL | Age: 46
Discharge: HOME | End: 2021-09-24
Attending: PHYSICAL MEDICINE & REHABILITATION
Payer: COMMERCIAL

## 2021-09-24 DIAGNOSIS — Q76.49 BERTOLOTTI'S SYNDROME: ICD-10-CM

## 2021-09-24 DIAGNOSIS — G89.29 CHRONIC BILATERAL LOW BACK PAIN WITH BILATERAL SCIATICA: ICD-10-CM

## 2021-09-24 DIAGNOSIS — M54.41 CHRONIC BILATERAL LOW BACK PAIN WITH BILATERAL SCIATICA: ICD-10-CM

## 2021-09-24 DIAGNOSIS — M54.42 CHRONIC BILATERAL LOW BACK PAIN WITH BILATERAL SCIATICA: ICD-10-CM

## 2021-09-24 PROCEDURE — G1004 CDSM NDSC: HCPCS

## 2021-09-24 PROCEDURE — 72148 MRI LUMBAR SPINE W/O DYE: CPT

## 2022-02-01 ENCOUNTER — OFFICE VISIT (OUTPATIENT)
Dept: PHYSICAL THERAPY | Facility: CLINIC | Age: 47
End: 2022-02-01
Payer: COMMERCIAL

## 2022-02-01 DIAGNOSIS — M51.26 DISPLACEMENT OF LUMBAR INTERVERTEBRAL DISC: Primary | ICD-10-CM

## 2022-02-01 PROCEDURE — 97112 NEUROMUSCULAR REEDUCATION: CPT | Performed by: PHYSICAL THERAPIST

## 2022-02-01 PROCEDURE — 97535 SELF CARE MNGMENT TRAINING: CPT | Performed by: PHYSICAL THERAPIST

## 2022-02-01 PROCEDURE — 97161 PT EVAL LOW COMPLEX 20 MIN: CPT | Performed by: PHYSICAL THERAPIST

## 2022-02-01 NOTE — PROGRESS NOTES
PT Evaluation     Today's date: 2022  Patient name: Suraj Keys  : 1975  MRN: [id number]  Referring provider: Elena Hernandez PT  Dx:   Encounter Diagnosis     ICD-10-CM    1  Displacement of lumbar intervertebral disc  M51 26                   Assessment  Assessment details: Suraj Keys is a 48 y o  female who presents with pain and decreased ROM  Due to these impairments, patient has difficulty performing ADL's, recreational activities, lifting/carrying  Patient's clinical presentation is consistent with their referring diagnosis of Acute back pain with sciatica, right  (primary encounter diagnosis)    Patient has been educated in home exercise program and plan of care  Patient would benefit from skilled physical therapy services to address their aforementioned functional limitations and progress towards prior level of function and independence with home exercise program      Impairments: abnormal muscle firing, abnormal or restricted ROM, activity intolerance, impaired physical strength, lacks appropriate home exercise program and pain with function  Understanding of Dx/Px/POC: good   Prognosis: good    Goals  Short Term Goals:    1  Initiate and advance HEP  2  AROM 20 degrees lumbar Ext  3  (-) Ely's test Bilaterally    Long Term Goals:    1  Indep with HEP  2  Walk dog for 1 hr  3  Vacuum entire house    Plan  Plan details:  Focus on lumbar ext program and multifidus strengthening  Patient would benefit from: skilled PT  Planned modality interventions: cryotherapy, electrical stimulation/Russian stimulation, thermotherapy: hydrocollator packs and traction  Planned therapy interventions: joint mobilization, manual therapy, patient education, postural training, activity modification, abdominal trunk stabilization, body mechanics training, flexibility, functional ROM exercises, graded exercise, home exercise program, neuromuscular re-education, strengthening, stretching, therapeutic activities, therapeutic exercise, motor coordination training, muscle pump exercises, gait training, balance/weight bearing training and ADL training  Frequency: 2x week  Duration in weeks: 8  Treatment plan discussed with: patient        Subjective Evaluation    History of Present Illness  Mechanism of injury: Pt reports LBP began 2022 while getting into the car  Quality of life: poor    Pain  Current pain rating: 3  At best pain rating: 3  At worst pain ratin  Quality: sharp, dull ache and tight  Relieving factors: heat, medications and change in position  Aggravating factors: standing and sitting  Progression: improved    Treatments  Previous treatment: physical therapy, medication, injection treatment, chiropractic and massage  Current treatment: medication and physical therapy  Patient Goals  Patient goals for therapy: decreased pain, increased motion, increased strength, independence with ADLs/IADLs and return to sport/leisure activities          Objective     Postural Observations  Seated posture:   Active Range of Motion     Lumbar   Flexion: 90 degrees   Extension: 9 degrees   Left lateral flexion: 21 degrees       Right lateral flexion: 26 degrees   Left Hip   Flexion: 109 degrees   Extension: -8 degrees   External rotation (90/90): 0 degrees WFL  Internal rotation (90/90): 0 degrees WFL    Right Hip   Flexion: 108 degrees   Extension: -7 degrees   External rotation (90/90): 0 degrees WFL  Internal rotation (90/90): 0 degrees WFL    Strength/Myotome Testing     Left Hip   Planes of Motion   Flexion: 4  Extension: 2-  Abduction: 2+  External rotation: 4+  Internal rotation: 4+    Right Hip   Planes of Motion   Flexion: 4+  Extension: 2-  Abduction: 2+  External rotation: 4+  Internal rotation: 4+    Additional Strength Details  Core: 5 /5    Tests     Lumbar     Left   Negative slump test      Right   Negative slump test      Left Hip   Positive Ely's  Negative long sit       Right Hip Positive Ely's and long sit       Additional Tests Details  Repeated Motion Test:  Flexion: Peripheralized  Extension: Centralized    Supine Long Sit test:  R upslip < 2 cm  Hamstring Flexibility:  R:-25  L:-23            Flowsheet Rows      Most Recent Value   PT/OT G-Codes    Current Score 40   Projected Score 63             Precautions: Fibromyalgia      Manuals 2/1        MFR to lumbar paraspinals, QL         Lumbar P->A mobs                           Neuro Re-Ed         ARIE 30x        PPU         SAG's          Multifidus Set         Push Up Taps         Bird Dogs                  Ther Ex         Elliptical         LTR         Supine Hip Flexor stretch 1 5' ea        QL Stretch   30" hold         Hip 4-way                                    Ther Activity                           Gait Training                           Modalities

## 2022-02-01 NOTE — LETTER
2022    Daisy Smart MD  [address] [place] [address]    Patient: Adrianna Jamil   YOB: 1975   Date of Visit: 2022     Encounter Diagnosis     ICD-10-CM    1  Displacement of lumbar intervertebral disc  M51 26        Dear Dr Huang Taiwanese: Thank you for your recent referral of Adrianna Jamil  Please review the attached evaluation summary from Lorena's recent visit  Please verify that you agree with the plan of care by signing the attached order  If you have any questions or concerns, please do not hesitate to call  I sincerely appreciate the opportunity to share in the care of one of your patients and hope to have another opportunity to work with you in the near future  Sincerely,    Ric Fernandez, PT      Referring Provider:      I certify that I have read the below Plan of Care and certify the need for these services furnished under this plan of treatment while under my care  Daisy Smart MD  [address] [place] [address]  Via Fax: [phone]          PT Evaluation     Today's date: 2022  Patient name: Adrianna Jaiml  : 1975  MRN: [id number]  Referring provider: Hank Nolasco, PT  Dx:   Encounter Diagnosis     ICD-10-CM    1  Displacement of lumbar intervertebral disc  M51 26                   Assessment  Assessment details: Adrianna Jamil is a 48 y o  female who presents with pain and decreased ROM  Due to these impairments, patient has difficulty performing ADL's, recreational activities, lifting/carrying  Patient's clinical presentation is consistent with their referring diagnosis of Acute back pain with sciatica, right  (primary encounter diagnosis)    Patient has been educated in home exercise program and plan of care   Patient would benefit from skilled physical therapy services to address their aforementioned functional limitations and progress towards prior level of function and independence with home exercise program      Impairments: abnormal muscle firing, abnormal or restricted ROM, activity intolerance, impaired physical strength, lacks appropriate home exercise program and pain with function  Understanding of Dx/Px/POC: good   Prognosis: good    Goals  Short Term Goals:    1  Initiate and advance HEP  2  AROM 20 degrees lumbar Ext  3  (-) Ely's test Bilaterally    Long Term Goals:    1  Indep with HEP  2  Walk dog for 1 hr  3  Vacuum entire house    Plan  Plan details: Focus on lumbar ext program and multifidus strengthening  Patient would benefit from: skilled PT  Planned modality interventions: cryotherapy, electrical stimulation/Russian stimulation, thermotherapy: hydrocollator packs and traction  Planned therapy interventions: joint mobilization, manual therapy, patient education, postural training, activity modification, abdominal trunk stabilization, body mechanics training, flexibility, functional ROM exercises, graded exercise, home exercise program, neuromuscular re-education, strengthening, stretching, therapeutic activities, therapeutic exercise, motor coordination training, muscle pump exercises, gait training, balance/weight bearing training and ADL training  Frequency: 2x week  Duration in weeks: 8  Treatment plan discussed with: patient        Subjective Evaluation    History of Present Illness  Mechanism of injury: Pt reports LBP began 2022 while getting into the car      Quality of life: poor    Pain  Current pain rating: 3  At best pain rating: 3  At worst pain ratin  Quality: sharp, dull ache and tight  Relieving factors: heat, medications and change in position  Aggravating factors: standing and sitting  Progression: improved    Treatments  Previous treatment: physical therapy, medication, injection treatment, chiropractic and massage  Current treatment: medication and physical therapy  Patient Goals  Patient goals for therapy: decreased pain, increased motion, increased strength, independence with ADLs/IADLs and return to sport/leisure activities          Objective     Postural Observations  Seated posture:   Active Range of Motion     Lumbar   Flexion: 90 degrees   Extension: 9 degrees   Left lateral flexion: 21 degrees       Right lateral flexion: 26 degrees   Left Hip   Flexion: 109 degrees   Extension: -8 degrees   External rotation (90/90): 0 degrees WFL  Internal rotation (90/90): 0 degrees WFL    Right Hip   Flexion: 108 degrees   Extension: -7 degrees   External rotation (90/90): 0 degrees WFL  Internal rotation (90/90): 0 degrees WFL    Strength/Myotome Testing     Left Hip   Planes of Motion   Flexion: 4  Extension: 2-  Abduction: 2+  External rotation: 4+  Internal rotation: 4+    Right Hip   Planes of Motion   Flexion: 4+  Extension: 2-  Abduction: 2+  External rotation: 4+  Internal rotation: 4+    Additional Strength Details  Core: 5 /5    Tests     Lumbar     Left   Negative slump test      Right   Negative slump test      Left Hip   Positive Ely's  Negative long sit  Right Hip   Positive Ely's and long sit       Additional Tests Details  Repeated Motion Test:  Flexion: Peripheralized  Extension: Centralized    Supine Long Sit test:  R upslip < 2 cm  Hamstring Flexibility:  R:-25  L:-23            Flowsheet Rows      Most Recent Value   PT/OT G-Codes    Current Score 40   Projected Score 63             Precautions: Fibromyalgia      Manuals 2/1        MFR to lumbar paraspinals, QL         Lumbar P->A mobs                           Neuro Re-Ed         ARIE 30x        PPU         SAG's          Multifidus Set         Push Up Taps         Bird Dogs                  Ther Ex         Elliptical         LTR         Supine Hip Flexor stretch 1 5' ea        QL Stretch   30" hold         Hip 4-way                                    Ther Activity                           Gait Training                           Modalities

## 2022-02-03 ENCOUNTER — OFFICE VISIT (OUTPATIENT)
Dept: PHYSICAL THERAPY | Facility: CLINIC | Age: 47
End: 2022-02-03
Payer: COMMERCIAL

## 2022-02-03 DIAGNOSIS — M51.26 DISPLACEMENT OF LUMBAR INTERVERTEBRAL DISC: Primary | ICD-10-CM

## 2022-02-03 PROCEDURE — 97110 THERAPEUTIC EXERCISES: CPT

## 2022-02-03 PROCEDURE — 97140 MANUAL THERAPY 1/> REGIONS: CPT

## 2022-02-03 NOTE — PROGRESS NOTES
Daily Note     Today's date: 2/3/2022  Patient name: Bernell Felty  : 1975  MRN: [id number]  Referring provider: Deanne Hilton PT  Dx:   Encounter Diagnosis     ICD-10-CM    1  Displacement of lumbar intervertebral disc  M51 26                   Subjective: Patient states her back is sore "probably because of the weather"  She is compliant with her HEP  Objective: See treatment diary below      Assessment: Tolerated treatment well  Patient exhibited good technique with therapeutic exercises  Will progress program as able to meet goals  Plan: Continue per plan of care        Precautions: Fibromyalgia      Manuals  2/3       MFR to lumbar paraspinals, QL  10'       Lumbar P->A mobs  5'                         Neuro Re-Ed         ARIE 30x 30x       PPU         SAG's          Multifidus Set  30x       Push Up Taps         Bird Dogs                  Ther Ex         Elliptical  6' L1       LTR         Supine Hip Flexor stretch 1 5' ea 1 5' ea       QL Stretch   30" hold         Hip 4-way  10x Red                                  Ther Activity                           Gait Training                           Modalities

## 2022-02-04 ENCOUNTER — APPOINTMENT (OUTPATIENT)
Dept: PHYSICAL THERAPY | Facility: CLINIC | Age: 47
End: 2022-02-04
Payer: COMMERCIAL

## 2022-02-08 ENCOUNTER — OFFICE VISIT (OUTPATIENT)
Dept: PHYSICAL THERAPY | Facility: CLINIC | Age: 47
End: 2022-02-08
Payer: COMMERCIAL

## 2022-02-08 DIAGNOSIS — M51.26 DISPLACEMENT OF LUMBAR INTERVERTEBRAL DISC: Primary | ICD-10-CM

## 2022-02-08 PROCEDURE — 97112 NEUROMUSCULAR REEDUCATION: CPT

## 2022-02-08 PROCEDURE — 97140 MANUAL THERAPY 1/> REGIONS: CPT

## 2022-02-08 PROCEDURE — 97110 THERAPEUTIC EXERCISES: CPT

## 2022-02-08 NOTE — PROGRESS NOTES
Daily Note     Today's date: 2022  Patient name: David Fofana  : 1975  MRN: [id number]  Referring provider: Wendy Aponte, PT  Dx:   Encounter Diagnosis     ICD-10-CM    1  Displacement of lumbar intervertebral disc  M51 26                   Subjective: patient stated she is sore today but that she is always sore  Objective: See treatment diary below      Assessment: Patient tolerated treatment well  No exacerbation of sx with program  Patient able to complete all tasks assigned with good execution  Patient would benefit from continued therapy to decrease pain and increase strength and flexibility to improve LOF  Plan: Continue per plan of care  Progress treatment as tolerated         Precautions: Fibromyalgia      Manuals  2/3 2/8      MFR to lumbar paraspinals, QL  10' 10'      Lumbar P->A mobs  5'                         Neuro Re-Ed         ARIE 30x 30x 30x      PPU         SAG's          Multifidus Set  30x 30x      Push Up Taps         Bird Dogs                  Ther Ex         Elliptical  6' L1 6' L1      LTR         Supine Hip Flexor stretch 1 5' ea 1 5' ea 1 5' each       QL Stretch   30" hold   3x      Hip 4-way  10x Red 15x red                                  Ther Activity                           Gait Training                           Modalities

## 2022-02-10 ENCOUNTER — OFFICE VISIT (OUTPATIENT)
Dept: PHYSICAL THERAPY | Facility: CLINIC | Age: 47
End: 2022-02-10
Payer: COMMERCIAL

## 2022-02-10 DIAGNOSIS — M51.26 DISPLACEMENT OF LUMBAR INTERVERTEBRAL DISC: Primary | ICD-10-CM

## 2022-02-10 PROCEDURE — 97112 NEUROMUSCULAR REEDUCATION: CPT

## 2022-02-10 PROCEDURE — 97140 MANUAL THERAPY 1/> REGIONS: CPT

## 2022-02-10 PROCEDURE — 97110 THERAPEUTIC EXERCISES: CPT

## 2022-02-10 NOTE — PROGRESS NOTES
Daily Note     Today's date: 2/10/2022  Patient name: Bernell Felty  : 1975  MRN: [id number]  Referring provider: Deanne Hilton PT  Dx:   Encounter Diagnosis     ICD-10-CM    1  Displacement of lumbar intervertebral disc  M51 26                   Subjective: Patient reports she is sore "as usual "       Objective: See treatment diary below      Assessment: Tolerated treatment well  Patient unable to tolerate kneeling due to knee symptoms  Bird dogs replaced with prone AOL  No exacerbation of sx with program  Patient able to complete all tasks assigned with good execution  Patient would benefit from continued therapy to decrease pain and increase strength and flexibility to improve LOF  Plan: Continue per plan of care        Precautions: Fibromyalgia      Manuals  2/3 2/8 2/10     MFR to lumbar paraspinals, QL  10' 10' 10'     Lumbar P->A mobs  5'  5'                       Neuro Re-Ed         ARIE 30x 30x 30x 30x     PPU         SAG's          Multifidus Set  30x 30x 30x     Push Up Taps         (Bird Dogs - unable)    See note (unable)     Prone OAL    10x ea     Ther Ex         Elliptical  6' L1 6' L1 L1 6'     LTR         Supine Hip Flexor stretch 1 5' ea 1 5' ea 1 5' each  1 5' ea     QL Stretch   30" hold   3x 3x     Hip 4-way  10x Red 15x red  15x red                                Ther Activity                           Gait Training                           Modalities

## 2022-02-15 ENCOUNTER — OFFICE VISIT (OUTPATIENT)
Dept: PHYSICAL THERAPY | Facility: CLINIC | Age: 47
End: 2022-02-15
Payer: COMMERCIAL

## 2022-02-15 DIAGNOSIS — M51.26 DISPLACEMENT OF LUMBAR INTERVERTEBRAL DISC: Primary | ICD-10-CM

## 2022-02-15 PROCEDURE — 97110 THERAPEUTIC EXERCISES: CPT | Performed by: PHYSICAL THERAPIST

## 2022-02-15 PROCEDURE — 97112 NEUROMUSCULAR REEDUCATION: CPT | Performed by: PHYSICAL THERAPIST

## 2022-02-15 PROCEDURE — 97140 MANUAL THERAPY 1/> REGIONS: CPT | Performed by: PHYSICAL THERAPIST

## 2022-02-15 NOTE — PROGRESS NOTES
Daily Note     Today's date: 2/15/2022  Patient name: Brittnee Mayo  : 1975  MRN: [id number]  Referring provider: Maral Arboleda, PT  Dx:   Encounter Diagnosis     ICD-10-CM    1  Displacement of lumbar intervertebral disc  M51 26                   Subjective: Pt reports feeling sore today from vacuuming yesterday      Objective: See treatment diary below      Assessment: Tolerated treatment well  Patient requires VC's to rest during PPU when arms fatigue      Plan: Progress treatment as tolerated         Precautions: Fibromyalgia      Manuals 2/1 2/3 2/8 2/10 2/15    MFR to lumbar paraspinals, QL  10' 10' 10' 12'    Lumbar P->A mobs  5'  5' 3'                      Neuro Re-Ed         ARIE 30x 30x 30x 30x 10x    PPU     30x    SAG's          Multifidus Set  30x 30x 30x 30x    Push Up Taps         (Bird Dogs - unable)    See note (unable)     Prone OAL    10x ea 25x    Ther Ex         Elliptical  6' L1 6' L1 L1 6' L1 6 min    LTR         Supine Hip Flexor stretch 1 5' ea 1 5' ea 1 5' each  1 5' ea 1 5' ea    QL Stretch   30" hold   3x 3x 3x    Hip 4-way  10x Red 15x red  15x red 15x Red                               Ther Activity                           Gait Training                           Modalities

## 2022-02-17 ENCOUNTER — OFFICE VISIT (OUTPATIENT)
Dept: PHYSICAL THERAPY | Facility: CLINIC | Age: 47
End: 2022-02-17
Payer: COMMERCIAL

## 2022-02-17 DIAGNOSIS — M51.26 DISPLACEMENT OF LUMBAR INTERVERTEBRAL DISC: Primary | ICD-10-CM

## 2022-02-17 PROCEDURE — 97112 NEUROMUSCULAR REEDUCATION: CPT

## 2022-02-17 PROCEDURE — 97140 MANUAL THERAPY 1/> REGIONS: CPT

## 2022-02-17 PROCEDURE — 97110 THERAPEUTIC EXERCISES: CPT

## 2022-02-17 NOTE — PROGRESS NOTES
Daily Note     Today's date: 2022  Patient name: Que Villarreal  : 1975  MRN: [id number]  Referring provider: Kae Childs PT  Dx:   Encounter Diagnosis     ICD-10-CM    1  Displacement of lumbar intervertebral disc  M51 26                   Subjective: patient state she has her normal aches and pain but nothing more       Objective: See treatment diary below      Assessment: Patient able to progress with repetitions and resistance with various exercises  Good tolerance with progression with minimal cues required  Patient continues to present with deficits with strength and ROM requiring continued skilled physical therapy      Plan: Continue per plan of care  Progress treatment as tolerated         Precautions: Fibromyalgia      Manuals 2/1 2/3 2/8 2/10 2/15 2/17   MFR to lumbar paraspinals, QL  10' 10' 10' 12' 15'   Lumbar P->A mobs  5'  5' 3'                      Neuro Re-Ed         ARIE 30x 30x 30x 30x 10x 30x   PPU     30x 30x   SAG's          Multifidus Set  30x 30x 30x 30x 30x   Push Up Taps         (Bird Dogs - unable)    See note (unable)     Prone Opposite arm/leg    10x ea 25x 30x   Ther Ex         Elliptical  6' L1 6' L1 L1 6' L1 6 min L3 6 min    LTR 30" hd       3x   Supine Hip Flexor stretch 1 5' ea 1 5' ea 1 5' each  1 5' ea 1 5' ea 1 5' ea   QL Stretch   30" hold   3x 3x 3x missed   Hip 4-way  10x Red 15x red  15x red 15x Red 20x red    Standing opposite arm/leg      10x 5" hold    Standing back extension in// bars      10x 10" hd            Ther Activity                           Gait Training                           Modalities

## 2022-02-22 ENCOUNTER — OFFICE VISIT (OUTPATIENT)
Dept: PHYSICAL THERAPY | Facility: CLINIC | Age: 47
End: 2022-02-22
Payer: COMMERCIAL

## 2022-02-22 DIAGNOSIS — M51.26 DISPLACEMENT OF LUMBAR INTERVERTEBRAL DISC: Primary | ICD-10-CM

## 2022-02-22 PROCEDURE — 97112 NEUROMUSCULAR REEDUCATION: CPT | Performed by: PHYSICAL THERAPIST

## 2022-02-22 PROCEDURE — 97110 THERAPEUTIC EXERCISES: CPT | Performed by: PHYSICAL THERAPIST

## 2022-02-22 PROCEDURE — 97140 MANUAL THERAPY 1/> REGIONS: CPT | Performed by: PHYSICAL THERAPIST

## 2022-02-22 NOTE — PROGRESS NOTES
Daily Note     Today's date: 2022  Patient name: Ayse Conner  : 1975  MRN: [id number]  Referring provider: Abbey Birch PT  Dx:   Encounter Diagnosis     ICD-10-CM    1  Displacement of lumbar intervertebral disc  M51 26                   Subjective: Pt reports LBP this weekend which she relates to her hips being "out of whack"      Objective: See treatment diary below      Assessment: Tolerated treatment well   Patient with Right Upslip      Plan: F/U on Pelvic Alignment     Precautions: Fibromyalgia      Manuals 2/22  2/8 2/10 2/15 2/17   MFR to lumbar paraspinals, QL 12'  10' 10' 12' 15'   Lumbar P->A mobs    5' 3'    Correction of Pelvic Obliquity 3'                 Neuro Re-Ed         ARIE 10x  30x 30x 10x 30x   PPU 30x    30x 30x   SAG's          Multifidus Set   30x 30x 30x 30x   Push Up Taps         (Bird Dogs - unable)    See note (unable)     Prone Opposite arm/leg    10x ea 25x 30x   Ther Ex         Elliptical 6 min L3  6' L1 L1 6' L1 6 min L3 6 min    LTR 30" hd       3x   Supine Hip Flexor stretch   1 5' each  1 5' ea 1 5' ea 1 5' ea   QL Stretch   30" hold 3x  3x 3x 3x missed   Hip 4-way 20x Red  15x red  15x red 15x Red 20x red    Standing opposite arm/leg 10x 5" hold     10x 5" hold    Standing back extension in// bars 10x 10" hold     10x 10" hd            Ther Activity                           Gait Training                           Modalities

## 2022-02-24 ENCOUNTER — OFFICE VISIT (OUTPATIENT)
Dept: PHYSICAL THERAPY | Facility: CLINIC | Age: 47
End: 2022-02-24
Payer: COMMERCIAL

## 2022-02-24 DIAGNOSIS — M51.26 DISPLACEMENT OF LUMBAR INTERVERTEBRAL DISC: Primary | ICD-10-CM

## 2022-02-24 PROCEDURE — 97110 THERAPEUTIC EXERCISES: CPT | Performed by: PHYSICAL THERAPIST

## 2022-02-24 PROCEDURE — 97112 NEUROMUSCULAR REEDUCATION: CPT | Performed by: PHYSICAL THERAPIST

## 2022-02-24 PROCEDURE — 97140 MANUAL THERAPY 1/> REGIONS: CPT | Performed by: PHYSICAL THERAPIST

## 2022-02-24 NOTE — PROGRESS NOTES
Daily Note     Today's date: 2022  Patient name: Ta Arora  : 1975  MRN: [id number]  Referring provider: Ladonna Ulloa, PT  Dx:   Encounter Diagnosis     ICD-10-CM    1  Displacement of lumbar intervertebral disc  M51 26                   Subjective: Pt reports feeling better but thinks her hips get "pulled out" when walking her dog      Objective: See treatment diary below      Assessment: Tolerated treatment well  Patient would benefit from continued PT      Plan: Progress treatment as tolerated         Precautions: Fibromyalgia      Manuals 2/22 2/24  2/10 2/15 2/17   MFR to lumbar paraspinals, QL 12' 12'  10' 12' 15'   Lumbar P->A mobs    5' 3'    Correction of Pelvic Obliquity 3' 3'                Neuro Re-Ed         ARIE 10x 10x  30x 10x 30x   PPU 30x 30x   30x 30x   SAG's          Multifidus Set  30x  30x 30x 30x   Push Up Taps         (Bird Dogs - unable)    See note (unable)     Prone Opposite arm/leg  30x  10x ea 25x 30x   Ther Ex         Elliptical 6 min L3 6' L3  L1 6' L1 6 min L3 6 min    LTR 30" hd   3x    3x   Supine Hip Flexor stretch  1 5' ea  1 5' ea 1 5' ea 1 5' ea   QL Stretch   30" hold 3x 3x  3x 3x missed   Hip 4-way 20x Red 20x Red  15x red 15x Red 20x red    Standing opposite arm/leg 10x 5" hold 10x 5" hold    10x 5" hold    Standing back extension in// bars 10x 10" hold 10x 10" hd    10x 10" hd            Ther Activity                           Gait Training                           Modalities

## 2022-02-28 ENCOUNTER — APPOINTMENT (OUTPATIENT)
Dept: PHYSICAL THERAPY | Facility: CLINIC | Age: 47
End: 2022-02-28
Payer: COMMERCIAL